# Patient Record
Sex: MALE | Race: WHITE | NOT HISPANIC OR LATINO | Employment: OTHER | URBAN - METROPOLITAN AREA
[De-identification: names, ages, dates, MRNs, and addresses within clinical notes are randomized per-mention and may not be internally consistent; named-entity substitution may affect disease eponyms.]

---

## 2021-10-02 ENCOUNTER — HOSPITAL ENCOUNTER (EMERGENCY)
Facility: HOSPITAL | Age: 41
Discharge: HOME | End: 2021-10-02
Attending: EMERGENCY MEDICINE | Admitting: EMERGENCY MEDICINE

## 2021-10-02 ENCOUNTER — APPOINTMENT (OUTPATIENT)
Dept: GENERAL RADIOLOGY | Facility: HOSPITAL | Age: 41
End: 2021-10-02

## 2021-10-02 VITALS
DIASTOLIC BLOOD PRESSURE: 119 MMHG | TEMPERATURE: 96.9 F | HEIGHT: 73 IN | BODY MASS INDEX: 41.75 KG/M2 | SYSTOLIC BLOOD PRESSURE: 159 MMHG | WEIGHT: 315 LBS | HEART RATE: 84 BPM | RESPIRATION RATE: 17 BRPM | OXYGEN SATURATION: 96 %

## 2021-10-02 DIAGNOSIS — I10 ELEVATED BLOOD PRESSURE READING WITH DIAGNOSIS OF HYPERTENSION: ICD-10-CM

## 2021-10-02 DIAGNOSIS — R52 GENERALIZED BODY ACHES: ICD-10-CM

## 2021-10-02 DIAGNOSIS — U07.1 COVID-19 VIRUS INFECTION: Primary | ICD-10-CM

## 2021-10-02 LAB
ALBUMIN SERPL-MCNC: 3.9 G/DL (ref 3.5–5.2)
ALBUMIN/GLOB SERPL: 1.1 G/DL
ALP SERPL-CCNC: 58 U/L (ref 39–117)
ALT SERPL W P-5'-P-CCNC: 40 U/L (ref 1–41)
ANION GAP SERPL CALCULATED.3IONS-SCNC: 12 MMOL/L (ref 5–15)
AST SERPL-CCNC: 42 U/L (ref 1–40)
BACTERIA UR QL AUTO: NORMAL /HPF
BASOPHILS # BLD AUTO: 0.02 10*3/MM3 (ref 0–0.2)
BASOPHILS NFR BLD AUTO: 0.2 % (ref 0–1.5)
BILIRUB SERPL-MCNC: 0.4 MG/DL (ref 0–1.2)
BILIRUB UR QL STRIP: ABNORMAL
BUN SERPL-MCNC: 13 MG/DL (ref 6–20)
BUN/CREAT SERPL: 11.8 (ref 7–25)
CALCIUM SPEC-SCNC: 8.8 MG/DL (ref 8.6–10.5)
CHLORIDE SERPL-SCNC: 105 MMOL/L (ref 98–107)
CLARITY UR: CLEAR
CO2 SERPL-SCNC: 23 MMOL/L (ref 22–29)
COLOR UR: ABNORMAL
CREAT SERPL-MCNC: 1.1 MG/DL (ref 0.76–1.27)
DEPRECATED RDW RBC AUTO: 45.9 FL (ref 37–54)
EOSINOPHIL # BLD AUTO: 0.01 10*3/MM3 (ref 0–0.4)
EOSINOPHIL NFR BLD AUTO: 0.1 % (ref 0.3–6.2)
ERYTHROCYTE [DISTWIDTH] IN BLOOD BY AUTOMATED COUNT: 12.8 % (ref 12.3–15.4)
GFR SERPL CREATININE-BSD FRML MDRD: 75 ML/MIN/1.73
GLOBULIN UR ELPH-MCNC: 3.4 GM/DL
GLUCOSE SERPL-MCNC: 113 MG/DL (ref 65–99)
GLUCOSE UR STRIP-MCNC: NEGATIVE MG/DL
HCT VFR BLD AUTO: 45.8 % (ref 37.5–51)
HGB BLD-MCNC: 16.2 G/DL (ref 13–17.7)
HGB UR QL STRIP.AUTO: NEGATIVE
HOLD SPECIMEN: NORMAL
HOLD SPECIMEN: NORMAL
HYALINE CASTS UR QL AUTO: NORMAL /LPF
IMM GRANULOCYTES # BLD AUTO: 0.03 10*3/MM3 (ref 0–0.05)
IMM GRANULOCYTES NFR BLD AUTO: 0.3 % (ref 0–0.5)
KETONES UR QL STRIP: ABNORMAL
LEUKOCYTE ESTERASE UR QL STRIP.AUTO: NEGATIVE
LIPASE SERPL-CCNC: 35 U/L (ref 13–60)
LYMPHOCYTES # BLD AUTO: 2.75 10*3/MM3 (ref 0.7–3.1)
LYMPHOCYTES NFR BLD AUTO: 31.3 % (ref 19.6–45.3)
MCH RBC QN AUTO: 34.2 PG (ref 26.6–33)
MCHC RBC AUTO-ENTMCNC: 35.4 G/DL (ref 31.5–35.7)
MCV RBC AUTO: 96.6 FL (ref 79–97)
MONOCYTES # BLD AUTO: 0.43 10*3/MM3 (ref 0.1–0.9)
MONOCYTES NFR BLD AUTO: 4.9 % (ref 5–12)
MUCOUS THREADS URNS QL MICRO: NORMAL /HPF
NEUTROPHILS NFR BLD AUTO: 5.56 10*3/MM3 (ref 1.7–7)
NEUTROPHILS NFR BLD AUTO: 63.2 % (ref 42.7–76)
NITRITE UR QL STRIP: NEGATIVE
NRBC BLD AUTO-RTO: 0 /100 WBC (ref 0–0.2)
PH UR STRIP.AUTO: 5.5 [PH] (ref 5–8)
PLATELET # BLD AUTO: 224 10*3/MM3 (ref 140–450)
PMV BLD AUTO: 10.6 FL (ref 6–12)
POTASSIUM SERPL-SCNC: 4.1 MMOL/L (ref 3.5–5.2)
PROT SERPL-MCNC: 7.3 G/DL (ref 6–8.5)
PROT UR QL STRIP: ABNORMAL
RBC # BLD AUTO: 4.74 10*6/MM3 (ref 4.14–5.8)
RBC # UR: NORMAL /HPF
REF LAB TEST METHOD: NORMAL
SODIUM SERPL-SCNC: 140 MMOL/L (ref 136–145)
SP GR UR STRIP: 1.04 (ref 1–1.03)
SQUAMOUS #/AREA URNS HPF: NORMAL /HPF
TRANS CELLS #/AREA URNS HPF: NORMAL /HPF
UROBILINOGEN UR QL STRIP: ABNORMAL
WBC # BLD AUTO: 8.8 10*3/MM3 (ref 3.4–10.8)
WBC UR QL AUTO: NORMAL /HPF
WHOLE BLOOD HOLD SPECIMEN: NORMAL
WHOLE BLOOD HOLD SPECIMEN: NORMAL

## 2021-10-02 PROCEDURE — 83690 ASSAY OF LIPASE: CPT

## 2021-10-02 PROCEDURE — 99284 EMERGENCY DEPT VISIT MOD MDM: CPT

## 2021-10-02 PROCEDURE — 71045 X-RAY EXAM CHEST 1 VIEW: CPT

## 2021-10-02 PROCEDURE — 81001 URINALYSIS AUTO W/SCOPE: CPT | Performed by: EMERGENCY MEDICINE

## 2021-10-02 PROCEDURE — 80053 COMPREHEN METABOLIC PANEL: CPT

## 2021-10-02 PROCEDURE — 85025 COMPLETE CBC W/AUTO DIFF WBC: CPT

## 2021-10-02 RX ORDER — IBUPROFEN 800 MG/1
800 TABLET ORAL ONCE
Status: COMPLETED | OUTPATIENT
Start: 2021-10-02 | End: 2021-10-02

## 2021-10-02 RX ORDER — SODIUM CHLORIDE 9 MG/ML
10 INJECTION INTRAVENOUS AS NEEDED
Status: DISCONTINUED | OUTPATIENT
Start: 2021-10-02 | End: 2021-10-03 | Stop reason: HOSPADM

## 2021-10-02 RX ADMIN — IBUPROFEN 800 MG: 800 TABLET, FILM COATED ORAL at 23:51

## 2021-10-03 LAB — HOLD SPECIMEN: NORMAL

## 2021-10-03 NOTE — ED PROVIDER NOTES
Subjective   Patient is a 40 year old male who presents to the ED with complaints of bilateral flank pain. Patient reports that he is positive for COVID-19 infection, diagnosed on Tuesday. He reports since this time he has had generalized body aches and shortness of breath. Patient states that he was vaccinated. He also reports that received an antibody infusion this week. He feels as if he is still not getting any better and continues to have body aches which are most focused in his lower back on both sides. He denies anything specific that seems to make his symptoms better or worse. He denies any additional associated symptoms on exam. He shares that he was seen and evaluated at [facility] in their ED today with a workup and told there was nothing acute they could find and was discharged home.           Review of Systems   Constitutional: Positive for chills, fatigue and fever.   HENT: Negative.    Respiratory: Positive for shortness of breath. Negative for cough.    Cardiovascular: Negative.    Gastrointestinal: Negative.    Genitourinary: Negative.    Musculoskeletal: Positive for arthralgias, back pain and myalgias.   Skin: Negative.    Neurological: Negative.        No past medical history on file.    No Known Allergies    No past surgical history on file.    No family history on file.    Social History     Socioeconomic History   • Marital status: Single     Spouse name: Not on file   • Number of children: Not on file   • Years of education: Not on file   • Highest education level: Not on file           Objective   Physical Exam  Vitals and nursing note reviewed.   Constitutional:       General: He is not in acute distress.     Appearance: Normal appearance. He is obese. He is not ill-appearing or toxic-appearing.   HENT:      Head: Normocephalic and atraumatic.      Nose: Nose normal.      Mouth/Throat:      Mouth: Mucous membranes are moist.   Eyes:      Extraocular Movements: Extraocular  movements intact.      Conjunctiva/sclera: Conjunctivae normal.   Cardiovascular:      Rate and Rhythm: Normal rate and regular rhythm.      Pulses: Normal pulses.      Heart sounds: Normal heart sounds.   Pulmonary:      Effort: Pulmonary effort is normal. No respiratory distress.      Breath sounds: Normal breath sounds.   Abdominal:      Tenderness: There is no abdominal tenderness.   Musculoskeletal:         General: Normal range of motion.      Cervical back: Normal range of motion and neck supple.   Skin:     General: Skin is warm and dry.   Neurological:      General: No focal deficit present.      Mental Status: He is alert.   Psychiatric:         Mood and Affect: Mood normal.         Behavior: Behavior normal.         Thought Content: Thought content normal.         Judgment: Judgment normal.         Procedures           ED Course  ED Course as of Oct 03 1925   Sun Oct 03, 2021   1921 Patient COVID+ presents to ED with complaints of back pain and shortness of breath. No acute or emergent findings on physical exam, patient in no acute respiratory distress. No acute or emergent findings on labs or urinalysis. Chest x-ray demonstrates no active disease.  Patient is afebrile, nontoxic appearing, vital signs stable and able to maintain O2 sats of 96% on room air. Patient will be discharged home with symptomatic care and outpatient follow up to their primary care provider as recommended. Patient is agreeable to plan of care of outpatient follow up, provided clear return precautions and demonstrated understanding.    [JG]      ED Course User Index  [JG] Josue Garrett PA      Recent Results (from the past 24 hour(s))   Comprehensive Metabolic Panel    Collection Time: 10/02/21  8:57 PM    Specimen: Blood   Result Value Ref Range    Glucose 113 (H) 65 - 99 mg/dL    BUN 13 6 - 20 mg/dL    Creatinine 1.10 0.76 - 1.27 mg/dL    Sodium 140 136 - 145 mmol/L    Potassium 4.1 3.5 - 5.2 mmol/L    Chloride 105 98 - 107  mmol/L    CO2 23.0 22.0 - 29.0 mmol/L    Calcium 8.8 8.6 - 10.5 mg/dL    Total Protein 7.3 6.0 - 8.5 g/dL    Albumin 3.90 3.50 - 5.20 g/dL    ALT (SGPT) 40 1 - 41 U/L    AST (SGOT) 42 (H) 1 - 40 U/L    Alkaline Phosphatase 58 39 - 117 U/L    Total Bilirubin 0.4 0.0 - 1.2 mg/dL    eGFR Non African Amer 75 >60 mL/min/1.73    Globulin 3.4 gm/dL    A/G Ratio 1.1 g/dL    BUN/Creatinine Ratio 11.8 7.0 - 25.0    Anion Gap 12.0 5.0 - 15.0 mmol/L   Lipase    Collection Time: 10/02/21  8:57 PM    Specimen: Blood   Result Value Ref Range    Lipase 35 13 - 60 U/L   Green Top (Gel)    Collection Time: 10/02/21  8:57 PM   Result Value Ref Range    Extra Tube Hold for add-ons.    Lavender Top    Collection Time: 10/02/21  8:57 PM   Result Value Ref Range    Extra Tube hold for add-on    Gold Top - SST    Collection Time: 10/02/21  8:57 PM   Result Value Ref Range    Extra Tube Hold for add-ons.    Gray Top    Collection Time: 10/02/21  8:57 PM   Result Value Ref Range    Extra Tube Hold for add-ons.    Light Blue Top    Collection Time: 10/02/21  8:57 PM   Result Value Ref Range    Extra Tube hold for add-on    CBC Auto Differential    Collection Time: 10/02/21  8:57 PM    Specimen: Blood   Result Value Ref Range    WBC 8.80 3.40 - 10.80 10*3/mm3    RBC 4.74 4.14 - 5.80 10*6/mm3    Hemoglobin 16.2 13.0 - 17.7 g/dL    Hematocrit 45.8 37.5 - 51.0 %    MCV 96.6 79.0 - 97.0 fL    MCH 34.2 (H) 26.6 - 33.0 pg    MCHC 35.4 31.5 - 35.7 g/dL    RDW 12.8 12.3 - 15.4 %    RDW-SD 45.9 37.0 - 54.0 fl    MPV 10.6 6.0 - 12.0 fL    Platelets 224 140 - 450 10*3/mm3    Neutrophil % 63.2 42.7 - 76.0 %    Lymphocyte % 31.3 19.6 - 45.3 %    Monocyte % 4.9 (L) 5.0 - 12.0 %    Eosinophil % 0.1 (L) 0.3 - 6.2 %    Basophil % 0.2 0.0 - 1.5 %    Immature Grans % 0.3 0.0 - 0.5 %    Neutrophils, Absolute 5.56 1.70 - 7.00 10*3/mm3    Lymphocytes, Absolute 2.75 0.70 - 3.10 10*3/mm3    Monocytes, Absolute 0.43 0.10 - 0.90 10*3/mm3    Eosinophils, Absolute 0.01  0.00 - 0.40 10*3/mm3    Basophils, Absolute 0.02 0.00 - 0.20 10*3/mm3    Immature Grans, Absolute 0.03 0.00 - 0.05 10*3/mm3    nRBC 0.0 0.0 - 0.2 /100 WBC   Urinalysis With Microscopic If Indicated (No Culture) - Urine, Clean Catch    Collection Time: 10/02/21 10:43 PM    Specimen: Urine, Clean Catch   Result Value Ref Range    Color, UA Dark Yellow (A) Yellow, Straw    Appearance, UA Clear Clear    pH, UA 5.5 5.0 - 8.0    Specific Gravity, UA 1.037 (H) 1.001 - 1.030    Glucose, UA Negative Negative    Ketones, UA Trace (A) Negative    Bilirubin, UA Small (1+) (A) Negative    Blood, UA Negative Negative    Protein, UA 30 mg/dL (1+) (A) Negative    Leuk Esterase, UA Negative Negative    Nitrite, UA Negative Negative    Urobilinogen, UA 1.0 E.U./dL 0.2 - 1.0 E.U./dL   Urinalysis, Microscopic Only - Urine, Clean Catch    Collection Time: 10/02/21 10:43 PM    Specimen: Urine, Clean Catch   Result Value Ref Range    RBC, UA 0-2 None Seen, 0-2 /HPF    WBC, UA 0-2 None Seen, 0-2 /HPF    Bacteria, UA None Seen None Seen, Trace /HPF    Squamous Epithelial Cells, UA 0-2 None Seen, 0-2 /HPF    Transitional Epithelial Cells, UA 0-2 0 - 2 /HPF    Hyaline Casts, UA 0-6 0 - 6 /LPF    Mucus, UA Trace None Seen, Trace /HPF    Methodology Manual Light Microscopy      Note: In addition to lab results from this visit, the labs listed above may include labs taken at another facility or during a different encounter within the last 24 hours. Please correlate lab times with ED admission and discharge times for further clarification of the services performed during this visit.    XR Chest 1 View   Final Result   No active disease      Signer Name: Cristina Luque MD    Signed: 10/2/2021 11:12 PM    Workstation Name: [email or web address]     [facility]        Vitals:    10/02/21 2214 10/02/21 2230 10/02/21 2300 10/02/21 2330   BP: (!) 178/117 (!) 176/125 (!) 179/125 (!) 159/119   BP Location:       Patient Position:       Pulse:  106 97 89 84   Resp:       Temp:       TempSrc:       SpO2: 95% 94% 94% 96%   Weight:       Height:         Medications   ibuprofen (ADVIL,MOTRIN) tablet 800 mg (800 mg Oral Given 10/2/21 0132)     ECG/EMG Results (last 24 hours)     ** No results found for the last 24 hours. **        No orders to display                                          MDM  Number of Diagnoses or Management Options  COVID-19 virus infection: new and requires workup  Elevated blood pressure reading with diagnosis of hypertension: minor  Generalized body aches: new and requires workup     Amount and/or Complexity of Data Reviewed  Clinical lab tests: reviewed  Tests in the radiology section of CPT®: reviewed    Risk of Complications, Morbidity, and/or Mortality  Presenting problems: moderate  Diagnostic procedures: moderate  Management options: moderate    Patient Progress  Patient progress: stable      Final diagnoses:   COVID-19 virus infection   Generalized body aches   Elevated blood pressure reading with diagnosis of hypertension       ED Disposition  ED Disposition     ED Disposition Condition Comment    Discharge Stable           PATIENT [facility] [place] [address]  [phone]  Call   As needed for follow-up with primary care    [facility]  [address] [facility] [id number] [phone]  Go to   If symptoms worsen         Medication List      No changes were made to your prescriptions during this visit.          Josue Garrett PA  10/03/21 1925

## 2021-10-03 NOTE — DISCHARGE INSTRUCTIONS
Symptomatic care is recommended with tylenol or ibuprofen as needed for pain. Take all medications as prescribed and instructed. Follow up with primary care as directed or return to Emergency Department with worsening of symptoms.

## 2022-04-12 PROBLEM — I10 ESSENTIAL HYPERTENSION: Status: ACTIVE | Noted: 2022-04-12

## 2022-04-12 PROBLEM — E78.5 HYPERLIPIDEMIA LDL GOAL <100: Status: ACTIVE | Noted: 2022-04-12

## 2022-04-12 PROBLEM — R07.9 CHEST PAIN: Status: ACTIVE | Noted: 2022-04-12

## 2022-04-12 PROBLEM — I51.7 LVH (LEFT VENTRICULAR HYPERTROPHY): Status: ACTIVE | Noted: 2022-04-12

## 2022-04-12 PROBLEM — M79.606 LEG PAIN: Status: ACTIVE | Noted: 2022-04-12

## 2022-04-12 NOTE — PROGRESS NOTES
OFFICE VISIT  NOTE  [facility] CARDIOLOGY      Name: Ziggy Liriano    Date: 2022  MRN:  [id number]  :  1980      REFERRING/PRIMARY PROVIDER:  Provider, No Known    Chief Complaint   Patient presents with   • Ventricular hypertrophy   • Leg Swelling   • Edema       HPI: Ziggy Liriano is a 41 y.o. male who presents today for new consultation for left ventricular hypertrophy.  He reports increased lower extremity edema after having Covid in 2021 and then had COVID again 2021 and then lower extremity was started.  Also has chest pain and pressure, had nuclear stress test at [place] 2021 it was low risk, echo showed normal EF with mild LVH.  He was started on Lasix 40 mg daily but still has persistent lower extremity edema.  He has lost approximately 100 pounds over the last 1-2 years.  He saw a nutritionist yesterday working on low-sodium diet.    Past Medical History:   Diagnosis Date   • Sleep apnea        History reviewed. No pertinent surgical history.    Social History     Socioeconomic History   • Marital status: Single   Tobacco Use   • Smoking status: Former Smoker   • Smokeless tobacco: Former User     Types: Snuff   Substance and Sexual Activity   • Alcohol use: Never   • Drug use: Never   • Sexual activity: Defer       Family History   Problem Relation Age of Onset   • No Known Problems Mother    • No Known Problems Father         ROS:   Constitutional no fever,  no weight loss   Skin no rash, no subcutaneous nodules   Otolaryngeal no difficulty swallowing   Cardiovascular See HPI   Pulmonary no cough, no sputum production   Gastrointestinal no constipation, no diarrhea   Genitourinary no dysuria, no hematuria   Hematologic no easy bruisability, no abnormal bleeding   Musculoskeletal no muscle pain   Neurologic no dizziness, no falls         No Known Allergies      Current Outpatient Medications:   •  diclofenac (VOLTAREN) 75 MG EC tablet, As  "Needed., Disp: , Rfl:   •  dicyclomine (BENTYL) 20 MG tablet, As Needed., Disp: , Rfl:   •  fenofibrate 160 MG tablet, Take 160 mg by mouth Every Evening., Disp: , Rfl:   •  furosemide (LASIX) 40 MG tablet, Take 40 mg by mouth Daily., Disp: , Rfl:   •  losartan (COZAAR) 50 MG tablet, Take 50 mg by mouth Daily., Disp: , Rfl:     Vitals:    04/13/22 1329   BP: 142/84   BP Location: Left arm   Patient Position: Sitting   Pulse: 76   SpO2: 97%   Weight: (!) 164 kg (362 lb 3.2 oz)   Height: 185.4 cm (73\")     Body mass index is 47.79 kg/m².    PHYSICAL EXAM:    General Appearance:   · well developed  · well nourished  HENT:   · oropharynx moist  · lips not cyanotic  Neck:  · thyroid not enlarged  · supple  Respiratory:  · no respiratory distress  · normal breath sounds  · no rales  Cardiovascular:  · no jugular venous distention  · regular rhythm  · apical impulse normal  · S1 normal, S2 normal  · no S3, no S4   · no murmur  · no rub, no thrill  · carotid pulses normal; no bruit  · lower extremity edema: 3+ nonpitting    Musculoskeletal:  · no clubbing of fingers.   · normocephalic, head atraumatic  Skin:   · warm, dry  Psychiatric:  · judgement and insight appropriate  · normal mood and affect    RESULTS:     ECG 12 Lead    Date/Time: 4/13/2022 1:55 PM  Performed by: Maxwell Mendez MD  Authorized by: Maxwell Mendez MD   Comparison: not compared with previous ECG   Previous ECG: no previous ECG available  Rhythm: sinus rhythm  Rate: normal  BPM: 76  QRS axis: normal    Clinical impression: abnormal EKG  Comments: LVH noted                  Labs:  Lab Results   Component Value Date    AST 36 11/26/2021    ALT 51 (H) 11/26/2021     Lab Results   Component Value Date    HGBA1C 4.6 11/26/2021     No components found for: CREATINININE  eGFR Non  Am   Date Value Ref Range Status   11/26/2021 >60 >60 mL/min/1.73m*2 Final     Comment:     eGFR = estimated GFR; eGFR units = mL/min/1.73 sq meters Chronic Kidney " Disease is considered if eGFR <60 mL/min/1.73 sq meters Kidney failure is considered if eGFR is <15 mL/min/1.73 sq meters. eGFR assumes steady state plasma creatinine concentration; not applicable if renal function is rapidly changing or patient is on dialysis.   11/26/2021 >60 >60 mL/min/1.73m*2 Final     Comment:     eGFR = estimated GFR; eGFR units = mL/min/1.73 sq meters Chronic Kidney Disease is considered if eGFR <60 mL/min/1.73 sq meters Kidney failure is considered if eGFR is <15 mL/min/1.73 sq meters. eGFR assumes steady state plasma creatinine concentration; not applicable if renal function is rapidly changing or patient is on dialysis.     eGFR Non  Amer   Date Value Ref Range Status   10/02/2021 75 >60 mL/min/1.73 Final       Most recent PCP note, imaging tests, and labs reviewed.    ASSESSMENT:  Problem List Items Addressed This Visit        Cardiac and Vasculature    Essential hypertension - Primary    Relevant Medications    losartan (COZAAR) 50 MG tablet    furosemide (LASIX) 40 MG tablet    Hyperlipidemia LDL goal <100    Relevant Medications    fenofibrate 160 MG tablet    LVH (left ventricular hypertrophy)    Overview     11/5/21 Echo: EF 65-70%, mild LVH           Chest pain    Overview     11/5/21 Stress test: No evidence of ischemia or scar noted.              Musculoskeletal and Injuries    Leg pain    Overview     12/6/21 COSMO: Right 1.06, left 1.01                 PLAN:    1.  Concentric LVH:  Mild on echo 11/2021 at [place]  Likely secondary to uncontrolled hypertension  I advised the patient to take his medicines regularly, he misses his medicines 1-2 times per week.  Low-sodium diet increase exercise and weight loss recommended    2.  Nonpitting edema:  Increase Lasix to 60 mg daily  Low-sodium diet, exercise, weight loss recommended  Continue support stockings    3.  Hypertension:  We spent most of the visit talking about the importance of medication adherence, low-sodium diet,  exercise, and weight loss.  Continue current medical therapy.    Advance Care Planning   ACP discussion was held with the patient during this visit. Patient does not have an advance directive, declines further assistance.         Return to clinic in 6 months, or sooner as needed.    Thank you for the opportunity to share in the care of your patient; please do not hesitate to call me with any questions.     Maxwell Mendez MD, [facility]  Office: [phone] [address] [place] [address]    04/13/22

## 2022-04-13 ENCOUNTER — OFFICE VISIT (OUTPATIENT)
Dept: CARDIOLOGY | Facility: CLINIC | Age: 42
End: 2022-04-13

## 2022-04-13 VITALS
WEIGHT: 315 LBS | DIASTOLIC BLOOD PRESSURE: 84 MMHG | BODY MASS INDEX: 41.75 KG/M2 | SYSTOLIC BLOOD PRESSURE: 142 MMHG | HEIGHT: 73 IN | OXYGEN SATURATION: 97 % | HEART RATE: 76 BPM

## 2022-04-13 DIAGNOSIS — E78.5 HYPERLIPIDEMIA LDL GOAL <100: ICD-10-CM

## 2022-04-13 DIAGNOSIS — R07.9 CHEST PAIN, UNSPECIFIED TYPE: ICD-10-CM

## 2022-04-13 DIAGNOSIS — M79.604 PAIN IN BOTH LOWER EXTREMITIES: ICD-10-CM

## 2022-04-13 DIAGNOSIS — M79.605 PAIN IN BOTH LOWER EXTREMITIES: ICD-10-CM

## 2022-04-13 DIAGNOSIS — I51.7 LVH (LEFT VENTRICULAR HYPERTROPHY): ICD-10-CM

## 2022-04-13 DIAGNOSIS — I10 ESSENTIAL HYPERTENSION: Primary | ICD-10-CM

## 2022-04-13 PROCEDURE — 93000 ELECTROCARDIOGRAM COMPLETE: CPT | Performed by: INTERNAL MEDICINE

## 2022-04-13 PROCEDURE — 99204 OFFICE O/P NEW MOD 45 MIN: CPT | Performed by: INTERNAL MEDICINE

## 2022-04-13 RX ORDER — DICLOFENAC SODIUM 75 MG/1
75 TABLET, DELAYED RELEASE ORAL AS NEEDED
COMMUNITY
Start: 2022-04-04

## 2022-04-13 RX ORDER — FUROSEMIDE 40 MG/1
40 TABLET ORAL DAILY
COMMUNITY
Start: 2022-01-25

## 2022-04-13 RX ORDER — CHLORTHALIDONE 25 MG/1
TABLET ORAL AS NEEDED
COMMUNITY
Start: 2022-04-02 | End: 2022-04-13

## 2022-04-13 RX ORDER — LOSARTAN POTASSIUM 50 MG/1
50 TABLET ORAL DAILY
COMMUNITY
Start: 2022-03-01

## 2022-04-13 RX ORDER — DICYCLOMINE HCL 20 MG
20 TABLET ORAL AS NEEDED
COMMUNITY
Start: 2022-02-26

## 2022-04-13 RX ORDER — FENOFIBRATE 160 MG/1
160 TABLET ORAL EVERY EVENING
COMMUNITY
Start: 2022-03-30

## 2022-10-17 ENCOUNTER — OFFICE VISIT (OUTPATIENT)
Dept: CARDIOLOGY | Facility: CLINIC | Age: 42
End: 2022-10-17

## 2022-10-17 VITALS
HEART RATE: 91 BPM | DIASTOLIC BLOOD PRESSURE: 74 MMHG | WEIGHT: 315 LBS | SYSTOLIC BLOOD PRESSURE: 126 MMHG | HEIGHT: 73 IN | BODY MASS INDEX: 41.75 KG/M2 | OXYGEN SATURATION: 97 %

## 2022-10-17 DIAGNOSIS — E78.5 HYPERLIPIDEMIA LDL GOAL <100: ICD-10-CM

## 2022-10-17 DIAGNOSIS — I51.7 LVH (LEFT VENTRICULAR HYPERTROPHY): ICD-10-CM

## 2022-10-17 DIAGNOSIS — I10 ESSENTIAL HYPERTENSION: ICD-10-CM

## 2022-10-17 DIAGNOSIS — R07.9 CHEST PAIN, UNSPECIFIED TYPE: Primary | ICD-10-CM

## 2022-10-17 PROCEDURE — 99214 OFFICE O/P EST MOD 30 MIN: CPT | Performed by: INTERNAL MEDICINE

## 2022-10-17 RX ORDER — CETIRIZINE HYDROCHLORIDE 10 MG/1
10 TABLET ORAL DAILY
COMMUNITY
Start: 2022-10-03

## 2022-10-17 NOTE — PROGRESS NOTES
OFFICE VISIT  NOTE  [facility] CARDIOLOGY [place] [facility]      Name: Ziggy Liriano    Date: 10/17/2022  MRN:  [id number]  :  1980      REFERRING/PRIMARY PROVIDER:  Provider, No Known    Chief Complaint   Patient presents with   • Essential hypertension       HPI: Ziggy Liriano is a 41 y.o. male who presents today for left ventricular hypertrophy and hypertension.  He reports increased lower extremity edema after having Covid in 2021 and then had COVID again 2021 and then lower extremity was started.  Also has chest pain and pressure, had nuclear stress test at [place] 2021 it was low risk, echo showed normal EF with mild LVH.  Doing better since first visit, blood pressure better controlled, he takes his medicines more regularly.  Working on weight loss, had some success but then back tract.  Same weight as he was 6 months ago    Past Medical History:   Diagnosis Date   • Sleep apnea        History reviewed. No pertinent surgical history.    Social History     Socioeconomic History   • Marital status: Single   Tobacco Use   • Smoking status: Former   • Smokeless tobacco: Former     Types: Snuff   Vaping Use   • Vaping Use: Never used   Substance and Sexual Activity   • Alcohol use: Never   • Drug use: Never   • Sexual activity: Defer       Family History   Problem Relation Age of Onset   • No Known Problems Mother    • No Known Problems Father         ROS:   Constitutional no fever,  no weight loss   Skin no rash, no subcutaneous nodules   Otolaryngeal no difficulty swallowing   Cardiovascular See HPI   Pulmonary no cough, no sputum production   Gastrointestinal no constipation, no diarrhea   Genitourinary no dysuria, no hematuria   Hematologic no easy bruisability, no abnormal bleeding   Musculoskeletal no muscle pain   Neurologic no dizziness, no falls         No Known Allergies      Current Outpatient Medications:   •  ALLERGY SERUM INJECTION, Inject   "under the skin into the appropriate area as directed 1 (One) Time Per Week., Disp: , Rfl:   •  cetirizine (zyrTEC) 10 MG tablet, Take 1 tablet by mouth Daily., Disp: , Rfl:   •  diclofenac (VOLTAREN) 75 MG EC tablet, Take 1 tablet by mouth As Needed., Disp: , Rfl:   •  dicyclomine (BENTYL) 20 MG tablet, Take 1 tablet by mouth As Needed., Disp: , Rfl:   •  fenofibrate 160 MG tablet, Take 160 mg by mouth Every Evening., Disp: , Rfl:   •  furosemide (LASIX) 40 MG tablet, Take 40 mg by mouth Daily., Disp: , Rfl:   •  losartan (COZAAR) 50 MG tablet, Take 50 mg by mouth Daily., Disp: , Rfl:     Vitals:    10/17/22 1151   BP: 126/74   BP Location: Left arm   Patient Position: Sitting   Cuff Size: Adult   Pulse: 91   SpO2: 97%   Weight: (!) 164 kg (361 lb)   Height: 185.4 cm (73\")     Body mass index is 47.63 kg/m².    PHYSICAL EXAM:    General Appearance:   · well developed  · well nourished  HENT:   · oropharynx moist  · lips not cyanotic  Neck:  · thyroid not enlarged  · supple  Respiratory:  · no respiratory distress  · normal breath sounds  · no rales  Cardiovascular:  · no jugular venous distention  · regular rhythm  · apical impulse normal  · S1 normal, S2 normal  · no S3, no S4   · no murmur  · no rub, no thrill  · carotid pulses normal; no bruit  · lower extremity edema: 3+ nonpitting    Musculoskeletal:  · no clubbing of fingers.   · normocephalic, head atraumatic  Skin:   · warm, dry  Psychiatric:  · judgement and insight appropriate  · normal mood and affect    RESULTS:   Procedures          Labs:  Lab Results   Component Value Date    AST 36 11/26/2021    ALT 51 (H) 11/26/2021     Lab Results   Component Value Date    HGBA1C 4.6 11/26/2021     No components found for: CREATINININE  eGFR Non  Am   Date Value Ref Range Status   11/26/2021 >60 >60 mL/min/1.73m*2 Final     Comment:     eGFR = estimated GFR; eGFR units = mL/min/1.73 sq meters Chronic Kidney Disease is considered if eGFR <60 mL/min/1.73 sq " meters Kidney failure is considered if eGFR is <15 mL/min/1.73 sq meters. eGFR assumes steady state plasma creatinine concentration; not applicable if renal function is rapidly changing or patient is on dialysis.   11/26/2021 >60 >60 mL/min/1.73m*2 Final     Comment:     eGFR = estimated GFR; eGFR units = mL/min/1.73 sq meters Chronic Kidney Disease is considered if eGFR <60 mL/min/1.73 sq meters Kidney failure is considered if eGFR is <15 mL/min/1.73 sq meters. eGFR assumes steady state plasma creatinine concentration; not applicable if renal function is rapidly changing or patient is on dialysis.     eGFR Non  Amer   Date Value Ref Range Status   10/02/2021 75 >60 mL/min/1.73 Final       Most recent PCP note, imaging tests, and labs reviewed.    ASSESSMENT:  Problem List Items Addressed This Visit        Cardiac and Vasculature    Essential hypertension    Hyperlipidemia LDL goal <100    LVH (left ventricular hypertrophy)    Overview     11/5/21 Echo: EF 65-70%, mild LVH         Chest pain - Primary    Overview     11/5/21 Stress test: No evidence of ischemia or scar noted.            PLAN:    1.  Concentric LVH:  Mild on echo 11/2021 at [place]  Likely secondary to uncontrolled hypertension  I advised the patient to take his medicines regularly, he misses his medicines 1-2 times per week.  Low-sodium diet increase exercise and weight loss recommended    2.  Nonpitting edema:  Increase Lasix to 60 mg daily, if needed for edema  Low-sodium diet, exercise, weight loss recommended  Continue support stockings    3.  Hypertension:  Well-controlled on current regimen of losartan, continue for now.    4.  Morbid obesity, BMI 47:  Discussed importance of decreased caloric intake specifically carbohydrates, he had questions regarding bariatric surgery, I advised lifestyle modification first and using surgery as last resort.    Advance Care Planning   ACP discussion was held with the patient during this visit.  Patient does not have an advance directive, declines further assistance.         Return to clinic in 12 months, or sooner as needed.    Thank you for the opportunity to share in the care of your patient; please do not hesitate to call me with any questions.     Maxwell Mendez MD, [facility]  Office: [phone] [address] [place] [address]    10/17/22

## 2023-01-25 ENCOUNTER — TELEPHONE (OUTPATIENT)
Dept: CARDIOLOGY | Facility: CLINIC | Age: 43
End: 2023-01-25
Payer: COMMERCIAL

## 2023-01-25 DIAGNOSIS — R07.9 CHEST PAIN, UNSPECIFIED TYPE: Primary | ICD-10-CM

## 2023-01-25 DIAGNOSIS — R06.02 SOB (SHORTNESS OF BREATH): ICD-10-CM

## 2023-01-25 NOTE — TELEPHONE ENCOUNTER
Pt c/o chest pain but mentions he has had a lot of stress recently and he thinks may be r/t anxiety. However, he stated he's been having chest pains since his last appt 10/17/22. Pain localized in the middle of his chest, he rated the pain a 10/10 when he saw PCP on 1/23. Improved on its own but lasted till he went to bed that night, he took an ASA 81 mg right before bed. I asked if he had any SOB with the chest pain, he said he has asthma and wouldn't be able to tell if the SOB was r/t asthma or chest pain. Does not monitor HR or BP at home. Last stress test 11/2021- no evidence of ischemia noted, EF 70%. He is amendable to proceed with another stress test and echo. Pls advise.

## 2023-02-06 ENCOUNTER — TELEPHONE (OUTPATIENT)
Dept: CARDIOLOGY | Facility: CLINIC | Age: 43
End: 2023-02-06
Payer: COMMERCIAL

## 2023-02-06 DIAGNOSIS — R07.9 CHEST PAIN, UNSPECIFIED TYPE: Primary | ICD-10-CM

## 2023-02-06 NOTE — TELEPHONE ENCOUNTER
----- Message from Maxwell Mendez MD sent at 2023  8:53 AM EST -----  Regarding: RE: Peer to Peer  If patient is still having chest pain, switch echo/stress to coronary CTA  ----- Message -----  From: Ziggy Cortez RegSched Rep  Sent: 2/3/2023   5:12 PM EST  To: Maxwell Mendez MD, #  Subject: Peer to Peer                                     [facility] is wanting a peer to peer to approve his echo and stress test. I sent them everything we have on him but they said it didn't meet their internal guidelines. Would one of you be able to call in for that? We have five business days to call in if you do.     Phone: [phone] option 4  Trackin

## 2023-02-06 NOTE — TELEPHONE ENCOUNTER
Spoke with pt and he still reports intermittent chest pain and agreeable to proceed with coronary CTA. Orders placed and msg sent to Shabbir to schedule.

## 2023-02-09 ENCOUNTER — TELEPHONE (OUTPATIENT)
Dept: CARDIOLOGY | Facility: CLINIC | Age: 43
End: 2023-02-09
Payer: COMMERCIAL

## 2023-02-09 NOTE — TELEPHONE ENCOUNTER
Spoke with pt, he is currently at the [facility] and will call when he is released so I can get records.

## 2023-02-09 NOTE — TELEPHONE ENCOUNTER
Caller: Ziggy Liriano    Relationship: Self    Best call back number: [phone]    What is the best time to reach you: ANY    Who are you requesting to speak with (clinical staff, provider,  specific staff member): CLINICAL      What was the call regarding: PT IS HAVING CHEST PAIN ISSUES, SHARP PIN PRICKS ON EXERTION. HE SEES DR. MCFADDEN IN [place]. HE HAS A 1 YEAR FU TOWARDS THE END OF October AND NEEDS TO BE SEEN ALITTLE SOONER. PLEASE REACH OUT TO PT.     Do you require a callback: YES

## 2023-07-28 ENCOUNTER — TELEPHONE (OUTPATIENT)
Dept: CARDIOLOGY | Facility: CLINIC | Age: 43
End: 2023-07-28
Payer: COMMERCIAL

## 2023-07-28 NOTE — TELEPHONE ENCOUNTER
----- Message from Maxwell Mendez MD sent at 7/26/2023  8:52 AM EDT -----  Please inform the patient of their test results. Thank you.

## 2023-08-03 ENCOUNTER — TELEPHONE (OUTPATIENT)
Dept: CARDIOLOGY | Facility: CLINIC | Age: 43
End: 2023-08-03
Payer: COMMERCIAL

## 2023-08-03 DIAGNOSIS — R93.89 ABNORMAL CT OF THE CHEST: ICD-10-CM

## 2023-08-03 DIAGNOSIS — R06.02 SOB (SHORTNESS OF BREATH): Primary | ICD-10-CM

## 2023-09-14 ENCOUNTER — OFFICE VISIT (OUTPATIENT)
Dept: PULMONOLOGY | Facility: CLINIC | Age: 43
End: 2023-09-14
Payer: COMMERCIAL

## 2023-09-14 VITALS
TEMPERATURE: 98 F | BODY MASS INDEX: 41.75 KG/M2 | HEART RATE: 80 BPM | OXYGEN SATURATION: 96 % | HEIGHT: 73 IN | DIASTOLIC BLOOD PRESSURE: 78 MMHG | SYSTOLIC BLOOD PRESSURE: 118 MMHG | WEIGHT: 315 LBS

## 2023-09-14 DIAGNOSIS — R91.8 GROUND GLASS OPACITY PRESENT ON IMAGING OF LUNG: Primary | ICD-10-CM

## 2023-09-14 DIAGNOSIS — E66.8 EXTREME OBESITY: ICD-10-CM

## 2023-09-14 DIAGNOSIS — R06.09 DOE (DYSPNEA ON EXERTION): ICD-10-CM

## 2023-09-14 DIAGNOSIS — G47.33 OSA (OBSTRUCTIVE SLEEP APNEA): ICD-10-CM

## 2023-09-14 PROBLEM — E66.9 EXTREME OBESITY: Status: ACTIVE | Noted: 2023-09-14

## 2023-09-14 RX ORDER — METHOCARBAMOL 500 MG/1
1 TABLET, FILM COATED ORAL EVERY 6 HOURS
COMMUNITY
Start: 2023-05-24

## 2023-09-14 RX ORDER — MONTELUKAST SODIUM 10 MG/1
TABLET ORAL
COMMUNITY
Start: 2023-09-07

## 2023-09-14 RX ORDER — TRAMADOL HYDROCHLORIDE 50 MG/1
1 TABLET ORAL EVERY 6 HOURS
COMMUNITY
Start: 2023-05-24

## 2023-09-14 RX ORDER — OMEPRAZOLE 40 MG/1
CAPSULE, DELAYED RELEASE ORAL
COMMUNITY
Start: 2023-06-30

## 2023-09-14 NOTE — PROGRESS NOTES
PULMONARY  NOTE    Chief Complaint     Abnormal CT scan of the chest, dyspnea on exertion, obstructive sleep apnea, extreme obesity 42-year-old male referred for an abnormal CT scan of the chest        History of Present Illness     He has not smoked since around age 18  He denies any past history of known lung disease    He does have a history of obstructive sleep apnea and has been prescribed CPAP  Is not using it on a regular basis, however    He underwent a calcium scoring CT scan of the chest which did not completely image the lungs  There was suggestion of a right apical groundglass opacity and he was referred to our office    No regular cough or sputum production    He does get dyspnea on exertion, such as going up a flight of stairs    Patient Active Problem List   Diagnosis    Essential hypertension    Hyperlipidemia LDL goal <100    LVH (left ventricular hypertrophy)    Chest pain    Leg pain    Ground glass opacity Right [place] stable from 2021    WILLS (dyspnea on exertion)    Extreme obesity (BMI > 50)    LACY (obstructive sleep apnea) - non-compliant with CPAP      Allergies   Allergen Reactions    Beef (Diagnostic) Unknown - Low Severity     Tested positive    Mustard Unknown - Low Severity     Allergen tested positive    Shellfish-Derived Products Unknown - Low Severity     Allergen tested positive; reports that he has had iv contrast dye and tolerated without reaction       Current Outpatient Medications:     albuterol sulfate  (90 Base) MCG/ACT inhaler, Inhale 2 puffs Every 6 (Six) Hours As Needed., Disp: , Rfl:     fenofibrate 160 MG tablet, Take 1 tablet by mouth Daily., Disp: , Rfl:     fluticasone (FLONASE) 50 MCG/ACT nasal spray, 2 sprays by Each Nare route Daily. Shake liquid, Disp: , Rfl:     furosemide (LASIX) 40 MG tablet, Take 1 tablet by mouth Daily., Disp: , Rfl:     losartan (COZAAR) 50 MG tablet, Take 1 tablet by mouth Daily., Disp: , Rfl:     ALLERGY SERUM INJECTION, Inject   under the skin into the appropriate area as directed 1 (One) Time Per Week. (Patient not taking: Reported on 9/14/2023), Disp: , Rfl:     busPIRone (BUSPAR) 10 MG tablet, Take 1 tablet by mouth 2 (Two) Times a Day. (Patient not taking: Reported on 9/14/2023), Disp: , Rfl:     cetirizine (zyrTEC) 10 MG tablet, Take 1 tablet by mouth Daily. (Patient not taking: Reported on 9/14/2023), Disp: , Rfl:     diclofenac (VOLTAREN) 75 MG EC tablet, Take 1 tablet by mouth As Needed. (Patient not taking: Reported on 9/14/2023), Disp: , Rfl:     dicyclomine (BENTYL) 20 MG tablet, Take 1 tablet by mouth As Needed. (Patient not taking: Reported on 9/14/2023), Disp: , Rfl:     hydrOXYzine (ATARAX) 25 MG tablet, Take 1 tablet by mouth Daily As Needed. (Patient not taking: Reported on 9/14/2023), Disp: , Rfl:     levocetirizine (XYZAL) 5 MG tablet, Take 1 tablet by mouth Daily. (Patient not taking: Reported on 9/14/2023), Disp: , Rfl:     methocarbamol (ROBAXIN) 500 MG tablet, Take 1 tablet by mouth Every 6 (Six) Hours. (Patient not taking: Reported on 9/14/2023), Disp: , Rfl:     montelukast (SINGULAIR) 10 MG tablet, , Disp: , Rfl:     omeprazole (priLOSEC) 40 MG capsule, TAKE 1 CAPSULE BY MOUTH DAILY 1 HOUR BEFORE FIRST MEAL (Patient not taking: Reported on 9/14/2023), Disp: , Rfl:     traMADol (ULTRAM) 50 MG tablet, Take 1 tablet by mouth Every 6 (Six) Hours. (Patient not taking: Reported on 9/14/2023), Disp: , Rfl:   MEDICATION LIST AND ALLERGIES REVIEWED.    Family History   Problem Relation Age of Onset    No Known Problems Mother     No Known Problems Father      Social History     Tobacco Use    Smoking status: Former    Smokeless tobacco: Former     Types: Snuff   Vaping Use    Vaping Use: Never used   Substance Use Topics    Alcohol use: Never    Drug use: Never     Social History     Social History Narrative    Has not smoked cigarettes since around age 18    Dipped up into 2019    Smokes marijuana     FAMILY AND SOCIAL  "HISTORY REVIEWED.    Review of Systems  IF PRESENT REFER TO SCANNED ROS SHEET FROM SAME DATE  OTHERWISE ROS OBTAINED AND NON-CONTRIBUTORY OVER HPI.    /78 (BP Location: Left arm, Patient Position: Sitting, Cuff Size: Adult)   Pulse 80   Temp 98 °F (36.7 °C)   Ht 185.4 cm (73\")   Wt (!) 178 kg (393 lb)   SpO2 96% Comment: room air at rest  BMI 51.85 kg/m²   Physical Exam  Vitals and nursing note reviewed.   Constitutional:       General: He is not in acute distress.     Appearance: He is well-developed. He is not diaphoretic.   HENT:      Head: Normocephalic and atraumatic.   Neck:      Thyroid: No thyromegaly.   Cardiovascular:      Rate and Rhythm: Normal rate and regular rhythm.      Heart sounds: No murmur heard.  Pulmonary:      Effort: Pulmonary effort is normal.      Breath sounds: Normal breath sounds. No stridor.   Lymphadenopathy:      Cervical: No cervical adenopathy.      Upper Body:      Right upper body: No supraclavicular or epitrochlear adenopathy.      Left upper body: No supraclavicular or epitrochlear adenopathy.   Skin:     General: Skin is warm and dry.   Neurological:      Mental Status: He is alert and oriented to person, place, and time.   Psychiatric:         Behavior: Behavior normal.       Results     CT coronary calcium scoring chest from 7/23/2023 reviewed on PACS  CT angiogram from [facility] from 11/4/2021 reviewed on PACS  No pulmonary emboli noted in 2021  The incompletely imaged groundglass opacity in the right apex from 7/23/2023 corresponds to localized area of parenchymal compression related to a densely calcified process in the right apex that appears to be coming off the first rib    PFTs reveal no airway obstruction, no restriction, and a normal diffusion capacity    Immunization History   Administered Date(s) Administered    COVID-19 (NILESH) 04/06/2021    COVID-19 (PFIZER) BIVALENT 12+YRS 01/19/2023    COVID-19 (PFIZER) Purple Cap Monovalent 12/15/2021    " Influenza Injectable Mdck Pf Quad 10/15/2020    Td (TDVAX) 08/14/1997     Problem List       ICD-10-CM ICD-9-CM   1. Ground glass opacity Right [place] stable from 2021  R91.8 793.19   2. WILLS (dyspnea on exertion)  R06.09 786.09   3. LACY (obstructive sleep apnea) - non-compliant with CPAP  G47.33 327.23   4. Extreme obesity (BMI > 50)  E66.8 278.00       Discussion     We reviewed his test results  I reassured him that he has no evidence of obstructive or interstitial lung disease    The groundglass opacities barely seen on the calcium scoring CT scan was present and look the same in November 4, 2021 and is probably related to the presence of a calcified process extending off of the first rib and most likely represents a benign process.  This appears to have been stable for almost 2 years between November 4, 2021 and July 2023.  At the most, I would recommend a 1 year follow-up CT scan of the chest    I have encouraged him to follow-up with his physicians regarding his LACY treatment    I recommend efforts at regular exercise program and weight loss    I will plan to see him back in a year or earlier if there are any problems in the meantime    Moderate level of Medical Decision Making complexity based on 1 undiagnosed new problem or 2 stable chronic conditions, independent interpretation of tests, and/or prescription drug management     Emeka Sal MD  Note electronically signed    CC: Provider, No Known

## 2023-09-18 DIAGNOSIS — R06.09 DOE (DYSPNEA ON EXERTION): ICD-10-CM

## 2023-09-18 DIAGNOSIS — R91.8 GROUND GLASS OPACITY PRESENT ON IMAGING OF LUNG: Primary | ICD-10-CM

## 2023-10-23 ENCOUNTER — OFFICE VISIT (OUTPATIENT)
Dept: CARDIOLOGY | Facility: CLINIC | Age: 43
End: 2023-10-23
Payer: COMMERCIAL

## 2023-10-23 VITALS
DIASTOLIC BLOOD PRESSURE: 72 MMHG | HEART RATE: 85 BPM | OXYGEN SATURATION: 95 % | WEIGHT: 315 LBS | SYSTOLIC BLOOD PRESSURE: 114 MMHG | HEIGHT: 72 IN | BODY MASS INDEX: 42.66 KG/M2

## 2023-10-23 DIAGNOSIS — R06.09 DOE (DYSPNEA ON EXERTION): ICD-10-CM

## 2023-10-23 DIAGNOSIS — I51.7 LVH (LEFT VENTRICULAR HYPERTROPHY): ICD-10-CM

## 2023-10-23 DIAGNOSIS — E78.5 HYPERLIPIDEMIA LDL GOAL <100: ICD-10-CM

## 2023-10-23 DIAGNOSIS — I10 ESSENTIAL HYPERTENSION: Primary | ICD-10-CM

## 2023-10-23 PROCEDURE — 3078F DIAST BP <80 MM HG: CPT | Performed by: INTERNAL MEDICINE

## 2023-10-23 PROCEDURE — 99213 OFFICE O/P EST LOW 20 MIN: CPT | Performed by: INTERNAL MEDICINE

## 2023-10-23 PROCEDURE — 3074F SYST BP LT 130 MM HG: CPT | Performed by: INTERNAL MEDICINE

## 2023-10-23 NOTE — PROGRESS NOTES
"OFFICE VISIT  NOTE  [facility] CARDIOLOGY      Name: Ziggy Liriano    Date: 10/23/2023  MRN:  [id number]  :  1980      REFERRING/PRIMARY PROVIDER:  Toma Diallo APRN    Chief Complaint   Patient presents with    Chest Pain, unspecified type       HPI: Ziggy Liriano is a 42 y.o. male who presents today for left ventricular hypertrophy and hypertension.  He reports increased lower extremity edema after having Covid in 2021 and then had COVID again 2021 and then lower extremity was started.  Also has chest pain and pressure, had nuclear stress test at [place] 2021 it was low risk, echo showed normal EF with mild LVH.  Has gained some weight over the last 12 months but still having some swelling but overall less short of breath and no chest pain.  Although he did have an episode of chest pain after getting the COVID-vaccine.    Past Medical History:   Diagnosis Date    Arthritis     Asthma     Enlarged heart     Hypertension     Sleep apnea     \"THEY TOOK IT AWAY\" referring to his cpap mask       History reviewed. No pertinent surgical history.    Social History     Socioeconomic History    Marital status: Single   Tobacco Use    Smoking status: Former     Types: Cigarettes    Smokeless tobacco: Former     Types: Snuff   Vaping Use    Vaping Use: Never used   Substance and Sexual Activity    Alcohol use: Never    Drug use: Never    Sexual activity: Defer       Family History   Problem Relation Age of Onset    No Known Problems Mother     No Known Problems Father         ROS:   Constitutional no fever,  no weight loss   Skin no rash, no subcutaneous nodules   Otolaryngeal no difficulty swallowing   Cardiovascular See HPI   Pulmonary no cough, no sputum production   Gastrointestinal no constipation, no diarrhea   Genitourinary no dysuria, no hematuria   Hematologic no easy bruisability, no abnormal bleeding   Musculoskeletal no muscle pain   Neurologic " "no dizziness, no falls         Allergies   Allergen Reactions    Beef (Diagnostic) Unknown - Low Severity     Tested positive    Mustard Unknown - Low Severity     Allergen tested positive    Shellfish-Derived Products Unknown - Low Severity     Allergen tested positive; reports that he has had iv contrast dye and tolerated without reaction         Current Outpatient Medications:     albuterol sulfate  (90 Base) MCG/ACT inhaler, Inhale 2 puffs Every 6 (Six) Hours As Needed., Disp: , Rfl:     ALLERGY SERUM INJECTION, Inject  under the skin into the appropriate area as directed 1 (One) Time Per Week. Pt waiting for doctor to send him a new prescription, Disp: , Rfl:     busPIRone (BUSPAR) 10 MG tablet, Take 1 tablet by mouth Daily., Disp: , Rfl:     cetirizine (zyrTEC) 10 MG tablet, Take 1 tablet by mouth Daily., Disp: , Rfl:     diclofenac (VOLTAREN) 75 MG EC tablet, Take 1 tablet by mouth As Needed., Disp: , Rfl:     dicyclomine (BENTYL) 20 MG tablet, Take 1 tablet by mouth As Needed., Disp: , Rfl:     fenofibrate 160 MG tablet, Take 1 tablet by mouth Daily., Disp: , Rfl:     fluticasone (FLONASE) 50 MCG/ACT nasal spray, 2 sprays by Each Nare route Daily. Shake liquid, Disp: , Rfl:     furosemide (LASIX) 40 MG tablet, Take 1 tablet by mouth Daily., Disp: , Rfl:     hydrOXYzine (ATARAX) 25 MG tablet, Take 1 tablet by mouth Daily As Needed., Disp: , Rfl:     levocetirizine (XYZAL) 5 MG tablet, Take 1 tablet by mouth Daily., Disp: , Rfl:     losartan (COZAAR) 50 MG tablet, Take 1 tablet by mouth Daily., Disp: , Rfl:     Vitals:    10/23/23 1133   BP: 114/72   BP Location: Right arm   Patient Position: Sitting   Pulse: 85   SpO2: 95%   Weight: (!) 179 kg (394 lb)   Height: 182.9 cm (72\")     Body mass index is 53.44 kg/m².    PHYSICAL EXAM:    General Appearance:   well developed  well nourished  HENT:   oropharynx moist  lips not cyanotic  Neck:  thyroid not enlarged  supple  Respiratory:  no respiratory " "distress  normal breath sounds  no rales  Cardiovascular:  no jugular venous distention  regular rhythm  apical impulse normal  S1 normal, S2 normal  no S3, no S4   no murmur  no rub, no thrill  carotid pulses normal; no bruit  lower extremity edema: 3+ nonpitting    Musculoskeletal:  no clubbing of fingers.   normocephalic, head atraumatic  Skin:   warm, dry  Psychiatric:  judgement and insight appropriate  normal mood and affect    RESULTS:   Procedures          Labs:  Lab Results   Component Value Date    AST 36 11/26/2021    ALT 51 (H) 11/26/2021     Lab Results   Component Value Date    HGBA1C 4.6 11/26/2021     No components found for: \"CREATINININE\"  eGFR Non  Am   Date Value Ref Range Status   11/26/2021 >60 >60 mL/min/1.73m*2 Final     Comment:     eGFR = estimated GFR; eGFR units = mL/min/1.73 sq meters Chronic Kidney Disease is considered if eGFR <60 mL/min/1.73 sq meters Kidney failure is considered if eGFR is <15 mL/min/1.73 sq meters. eGFR assumes steady state plasma creatinine concentration; not applicable if renal function is rapidly changing or patient is on dialysis.   11/26/2021 >60 >60 mL/min/1.73m*2 Final     Comment:     eGFR = estimated GFR; eGFR units = mL/min/1.73 sq meters Chronic Kidney Disease is considered if eGFR <60 mL/min/1.73 sq meters Kidney failure is considered if eGFR is <15 mL/min/1.73 sq meters. eGFR assumes steady state plasma creatinine concentration; not applicable if renal function is rapidly changing or patient is on dialysis.     eGFR Non  Amer   Date Value Ref Range Status   10/02/2021 75 >60 mL/min/1.73 Final       Most recent PCP note, imaging tests, and labs reviewed.    ASSESSMENT:  Problem List Items Addressed This Visit          Cardiac and Vasculature    Essential hypertension - Primary    Hyperlipidemia LDL goal <100    LVH (left ventricular hypertrophy)    Overview     11/5/21 Echo: EF 65-70%, mild LVH         WILLS (dyspnea on exertion) "       PLAN:    1.  Concentric LVH:  Mild on echo 11/2021 at [place]  Likely secondary to uncontrolled hypertension  I advised the patient to take his medicines regularly, he misses his medicines 1-2 times per week.  Low-sodium diet increase exercise and weight loss recommended    2.  Nonpitting edema:  Continue Lasix to 60 mg daily, if needed for edema  Low-sodium diet, exercise, weight loss recommended  Continue support stockings  Weight loss strongly recommended    3.  Hypertension:  Well-controlled on current regimen of losartan, continue for now.    4.  Morbid obesity, BMI 53:  Discussed importance of decreased caloric intake specifically carbohydrates.  He may be a good candidate for semaglutide, he will discuss with PCP        Return to clinic in 12 months, or sooner as needed.    Thank you for the opportunity to share in the care of your patient; please do not hesitate to call me with any questions.     Maxwell Mendez MD, [facility]  Office: [phone] [address] [place] [address]    10/23/23

## 2023-10-24 ENCOUNTER — TELEPHONE (OUTPATIENT)
Dept: CARDIOLOGY | Facility: CLINIC | Age: 43
End: 2023-10-24

## 2023-10-24 NOTE — TELEPHONE ENCOUNTER
Caller: [facility]    Relationship: Provider    Best call back number:     What form or medical record are you requesting: LAST PROG NOTE AND LABS    Who is requesting this form or medical record from you: [facility]    How would you like to receive the form or medical records (pick-up, mail, fax): FAX  If fax, what is the fax number: [phone]      Timeframe paperwork needed: ASAP    Additional notes:

## 2023-11-30 ENCOUNTER — OFFICE VISIT (OUTPATIENT)
Dept: BEHAVIORAL HEALTH | Facility: CLINIC | Age: 43
End: 2023-11-30
Payer: COMMERCIAL

## 2023-11-30 ENCOUNTER — DOCUMENTATION (OUTPATIENT)
Dept: BARIATRICS/WEIGHT MGMT | Facility: CLINIC | Age: 43
End: 2023-11-30
Payer: COMMERCIAL

## 2023-11-30 ENCOUNTER — OFFICE VISIT (OUTPATIENT)
Dept: BARIATRICS/WEIGHT MGMT | Facility: CLINIC | Age: 43
End: 2023-11-30
Payer: COMMERCIAL

## 2023-11-30 VITALS
SYSTOLIC BLOOD PRESSURE: 134 MMHG | TEMPERATURE: 96.8 F | HEIGHT: 72 IN | BODY MASS INDEX: 42.66 KG/M2 | HEART RATE: 86 BPM | WEIGHT: 315 LBS | DIASTOLIC BLOOD PRESSURE: 90 MMHG

## 2023-11-30 DIAGNOSIS — I10 ESSENTIAL HYPERTENSION: ICD-10-CM

## 2023-11-30 DIAGNOSIS — E66.01 MORBID OBESITY WITH BMI OF 50.0-59.9, ADULT: Primary | ICD-10-CM

## 2023-11-30 DIAGNOSIS — Z55.0 UNABLE TO READ OR WRITE: ICD-10-CM

## 2023-11-30 DIAGNOSIS — G47.30 SLEEP APNEA, UNSPECIFIED TYPE: ICD-10-CM

## 2023-11-30 DIAGNOSIS — E78.5 HYPERLIPIDEMIA, UNSPECIFIED HYPERLIPIDEMIA TYPE: ICD-10-CM

## 2023-11-30 DIAGNOSIS — F41.9 ANXIETY AND DEPRESSION: ICD-10-CM

## 2023-11-30 DIAGNOSIS — R12 HEARTBURN: ICD-10-CM

## 2023-11-30 DIAGNOSIS — G89.29 OTHER CHRONIC PAIN: ICD-10-CM

## 2023-11-30 DIAGNOSIS — F32.A ANXIETY AND DEPRESSION: ICD-10-CM

## 2023-11-30 DIAGNOSIS — Z71.89 ENCOUNTER FOR PSYCHOLOGICAL ASSESSMENT PRIOR TO BARIATRIC SURGERY: ICD-10-CM

## 2023-11-30 PROBLEM — R60.9 EDEMA: Status: ACTIVE | Noted: 2023-11-30

## 2023-11-30 PROBLEM — R91.1 LESION OF RIGHT LUNG: Status: ACTIVE | Noted: 2023-11-30

## 2023-11-30 PROBLEM — M25.50 JOINT PAIN: Status: ACTIVE | Noted: 2023-11-30

## 2023-11-30 PROCEDURE — 1159F MED LIST DOCD IN RCRD: CPT | Performed by: PHYSICIAN ASSISTANT

## 2023-11-30 PROCEDURE — 99204 OFFICE O/P NEW MOD 45 MIN: CPT | Performed by: PHYSICIAN ASSISTANT

## 2023-11-30 PROCEDURE — 1160F RVW MEDS BY RX/DR IN RCRD: CPT | Performed by: PSYCHOLOGIST

## 2023-11-30 PROCEDURE — 90791 PSYCH DIAGNOSTIC EVALUATION: CPT | Performed by: PSYCHOLOGIST

## 2023-11-30 PROCEDURE — 1160F RVW MEDS BY RX/DR IN RCRD: CPT | Performed by: PHYSICIAN ASSISTANT

## 2023-11-30 PROCEDURE — 3075F SYST BP GE 130 - 139MM HG: CPT | Performed by: PHYSICIAN ASSISTANT

## 2023-11-30 PROCEDURE — 1159F MED LIST DOCD IN RCRD: CPT | Performed by: PSYCHOLOGIST

## 2023-11-30 PROCEDURE — 3080F DIAST BP >= 90 MM HG: CPT | Performed by: PHYSICIAN ASSISTANT

## 2023-11-30 RX ORDER — SODIUM CHLORIDE 0.9 % (FLUSH) 0.9 %
3 SYRINGE (ML) INJECTION EVERY 12 HOURS SCHEDULED
OUTPATIENT
Start: 2023-11-30

## 2023-11-30 RX ORDER — SODIUM CHLORIDE 9 MG/ML
40 INJECTION, SOLUTION INTRAVENOUS AS NEEDED
OUTPATIENT
Start: 2023-11-30

## 2023-11-30 RX ORDER — LOSARTAN POTASSIUM 100 MG/1
1 TABLET ORAL DAILY
COMMUNITY
Start: 2023-10-01

## 2023-11-30 RX ORDER — SODIUM CHLORIDE 0.9 % (FLUSH) 0.9 %
3-10 SYRINGE (ML) INJECTION AS NEEDED
OUTPATIENT
Start: 2023-11-30

## 2023-11-30 RX ORDER — SODIUM CHLORIDE 9 MG/ML
150 INJECTION, SOLUTION INTRAVENOUS CONTINUOUS
OUTPATIENT
Start: 2023-11-30

## 2023-11-30 SDOH — EDUCATIONAL SECURITY - EDUCATION ATTAINMENT: ILITERACY AND LOW LEVEL LITERACY: Z55.0

## 2023-11-30 NOTE — PROGRESS NOTES
"Weight Loss Surgery  Presurgical Nutrition Assessment     Ziggy Liriano  11/30/2023  [id number]  [id number]  1980   male    Surgery desired: LSG (sleeve gastrectomy)     HEIGHT 182.9 cm (72\")   WEIGHT 176 kg (387.5 #)   BMI 52.57        Highest weight ever:  211 kg  (465 #)      Allergies   Allergen Reactions    Beef (Diagnostic) Unknown - Low Severity     Tested positive    Mustard Unknown - Low Severity     Allergen tested positive    Shellfish-Derived Products Unknown - Low Severity     Allergen tested positive; reports that he has had iv contrast dye and tolerated without reaction       Current Outpatient Medications:     albuterol sulfate  (90 Base) MCG/ACT inhaler, Inhale 2 puffs Every 6 (Six) Hours As Needed., Disp: , Rfl:     ALLERGY SERUM INJECTION, Inject  under the skin into the appropriate area as directed 1 (One) Time Per Week. Pt waiting for doctor to send him a new prescription, Disp: , Rfl:     busPIRone (BUSPAR) 10 MG tablet, Take 1 tablet by mouth Daily., Disp: , Rfl:     cetirizine (zyrTEC) 10 MG tablet, Take 1 tablet by mouth Daily., Disp: , Rfl:     diclofenac (VOLTAREN) 75 MG EC tablet, Take 1 tablet by mouth As Needed., Disp: , Rfl:     dicyclomine (BENTYL) 20 MG tablet, Take 1 tablet by mouth As Needed., Disp: , Rfl:     fenofibrate 160 MG tablet, Take 1 tablet by mouth Daily., Disp: , Rfl:     fluticasone (FLONASE) 50 MCG/ACT nasal spray, 2 sprays by Each Nare route Daily. Shake liquid, Disp: , Rfl:     furosemide (LASIX) 40 MG tablet, Take 1 tablet by mouth Daily., Disp: , Rfl:     hydrOXYzine (ATARAX) 25 MG tablet, Take 1 tablet by mouth Daily As Needed., Disp: , Rfl:     levocetirizine (XYZAL) 5 MG tablet, Take 1 tablet by mouth Daily., Disp: , Rfl:     losartan (COZAAR) 50 MG tablet, Take 1 tablet by mouth Daily., Disp: , Rfl:       Subjective information: (A note at the top of Mr. Liriano's new pt packet, written by a daughter who filled out forms and food history " "for him, stated, \"I can only communicate [verbally], I can't read or write.\").  Mr  states that he had seen a dietitian in the past, but that she only gave him papers with no pictures he could understand and learn from. States he needs help learning how to eat. Also, patient states, his daughters live with him but they do not help with cooking or groceries.             Nutrition Recall   Example of Usual 24 hour intake:     Breakfast: \"I don't eat breakfast.\"  Only sometimes eats deer sausage and eggs.    Lunch: chicken breast OR occasional fast food hamburger with fries with water or a diet Coke from [employer], [employer], [employer] etc.     Dinner: chicken OR fast food, as above.  Patient states that he only eats green beans, corn or other vegetable from a can, or tomatoes.    Snacks: [employer] peanut butter cups (\"my weakness\"), chips, beef jerky, pie    Beverages of Choice: water, diet sodas    Food Allergies or Intolerances: beef, shellfish, mustard          Exercise / Activity: no personal exercise program.  None due to joint pain.      Assessment of Nutritional Adequacy, Excessive Intake or Deficiencies:        Protein intake is too low  to ensure successful weight loss.                                        Processed / simple Carbohydrate intake is: high in fast food                                       Balance of diet with a variety of fruits and vegetables is: minimal - canned corn or green beans                                                       Reliance on restaurant food including fast food is: moderate - states he eats out only weekly or bi-weekly. States his daughters provide this. occasional fast food hamburger with fries with water or a diet Coke from [employer], [employer], [employer] etc.                                                                           Ingestion of sweet beverages eg soda, sweet tea, fruit juice: not an issue - water or diet drinks only.      Education    Provided " Nutrition Guidelines for Bariatric and Metabolic Surgery   Reviewed guidelines for higher protein, limited carbohydrate diet to promote weight loss.  Encouraged patient to incorporate these principles of healthy eating.    Educated patient to wisely choose an appropriate protein supplement beverage for the post-surgery liquid diet.  Provided product guidelines and examples.    Explained importance of goal setting to help in changing eating behaviors that are not conducive to weight loss.  Specific macronutrient goals as below.   Provided follow-up options for support, including contact information for dietitians here.     Discussed importance of tracking grams of protein and carbohydrate in diet.  Web-based support information and apps for smart phones and computers given.        Nutrition Goals   Protein goal:  grams per day in three regular balanced meals and two to three high protein snacks each day, to ensure desired weight loss.   Carbohydrate goal:  100-140 grams per day  Beverage goal: Appropriate non-carbonated beverage intake.  Patient to wean self off of any sweet beverages, including soda.    Exercise Goals  Continue current exercise routine, if appropriate, and obtain approval from caregiver if physically limited for any reason.   Start activity plan per PCP/specialist advice if not currently exercising.     Recommend that team proceed with surgery and follow per protocol.   Linette Christiansen, JACE  11/30/2023  13:28 EST

## 2023-11-30 NOTE — PROGRESS NOTES
PROGRESS NOTE    Data:    Mathieu Liriano is a 43 y.o. male who met with the undersigned for a scheduled psychological evaluation from 2:45 - 3:30 pm.      Clinical Maneuvering/Intervention:      Chief complaint and history of presenting illness/Problems: struggling with obesity for several years. Despite trying different weight loss plans and diets, the pt reported being unsuccessful in losing weight. A psychological evaluation was conducted in order to assess past and current level of functioning. Areas assessed included, but were not limited to: perception of social support, perception of ability to face and deal with challenges in life (positive functioning), anxiety symptoms, depressive symptoms, perspective on beliefs/belief system, coping skills for stress, intelligence level, addiction issues, etc. Therapeutic rapport was established. Interventions conducted today were geared towards assessing the pt's readiness for weight loss surgery and identifying and psychological contraindications for undergoing such a major life change. Social support was deemed strong (specific to weight loss surgery/weight loss in this manner and in a general sense): mother, daughters, friends. Current psychological struggles were described as low, but included anxiety problems, chronic pain (e.g., swelling in feet), and financial problems. He is on disability and section 8 housing. He expressed that he cannot read/write, or work, but did say that he does [employer] for extra income. Coping skills for distress and related to undergoing a major life change such as weight loss surgery/weight loss were deemed adequate: sense of humor, pleasant, talkative, and relies on his social support system. The pt talked about the different aspects of weight loss surgery that concerned him, that he felt nervous about it, and later in the visit that he is not convinced that it is the right thing for him to do. He talked about different people who  have had it and how their experiences do not encourage him to have it. Also, he said that his mother is not convinced that he should have it either. When asked to rate himself on how ready he is mentally to have weight loss surgery, he said, about a 4 (    Past Family and Social History:      History of family mental health problems: none endorsed    Psychosocial history: treatment of psychiatric care in the past (N/A), alcohol/substance abuse treatment in the past (N/A) , alcohol/substance abuse problems (N/A), inpatient psychiatric care (N/A).    Mental Status Exam (MSE):  Hygiene:  good  Dress: normal  Attitude:  cooperative  Motor Activity: fidgety   Speech: pressured, tangential   Mood:   nervous  Affect:  congruent  Thought Processes: normal  Thought Content:  normal  Suicidal Thoughts:  not endorsed  Homicidal Thoughts:  not endorsed  Crisis Safety Plan: not needed   Hallucinations:  none      Patient's Support Network Includes:  family, friends      Progress toward goal: there is evidence to suggest that he is taking measures to improve the quality of his life including seeking weight loss surgery.       Functional Status: moderate      Prognosis: good with weight loss surgery    Evaluation, Diagnoses, and Ability/Capacity to Respond to Treatment:      The pt presented to be struggling with anxiety, depression, chronic pain and frustrations with being overweight (BMI = 52.57, morbid obesity). He states that he cannot read or write, though he did not seem to struggle to understand/respond to questions and statements in the evaluation. Speech was pressured and somewhat tangential; he was difficult to redirect at times. Results of MSE demonstrated a functional status of moderate. Strengths: asking for help, relying on his social support system, etc (see detailed list of coping skills above). Needed for growth (CPT code requirement for Weaknesses): weight loss.      From a psychological standpoint, the pt does  not yet present as a good candidate for bariatric surgery.     Treatment Plan:      Short term goals/required for weight loss surgery: As discussed in session, he is to first, perceive himself a 9 or 10 out of 10 in mental readiness for weight loss surgery (10 = as ready as he can be, 0 = not ready at all). Then he is to schedule himself for a second psychological evaluation for weight loss surgery.    Long term goals: Reach a healthy weight and in turn, feel better emotionally and physically.      Cookie Mike, PhD, LP

## 2023-11-30 NOTE — PROGRESS NOTES
"[facility] BARIATRIC SURGERY  [address]  [address]  [facility] [address] [phone]      Patient  Name:  Ziggy Liriano  :  1980      Date of Visit: 2023      Chief Complaint:  weight gain; unable to maintain weight loss      History of Present Illness:  Ziggy Liriano is a 43 y.o. male who presents today for evaluation, education and consultation regarding metabolic and bariatric surgery with Dr. Quinones.     Ziggy has been overweight his \"whole life\".  His maximum lifetime weight is 465 lbs.  Lost 100 lbs 2 year ago w/ dietary modifications, eating mostly chicken, but only maintained for 1 year.  Current weight is 387 lbs w/ BMI 52.       Disabled.  Cannot read or write.  Lives w/ his two teenage daughters.  Admits he is hesitant to proceed w/ surgery b/c he is their primary caregiver - needs to be around for them, but also says he knows that he needs to lose weight d/t his cardiac issues (LVH, HTN).        Complete history has been obtained and discussed today, as pertinent to metabolic/ bariatric surgery.     Past Medical History:   Diagnosis Date    Anxiety     Asthma     Edema     Lasix prn, no KCl    Hypertension     Joint pain     Lesion of right lung     likely benign, following w/ pulmonary, repeat CT in 1 year    LVH (left ventricular hypertrophy)     d/t HTN, follows w/ Dr. Mendez    Morbid obesity with BMI of 50.0-59.9, adult     Sleep apnea     non-compliant w/ device     History reviewed. No pertinent surgical history.    Allergies   Allergen Reactions    Beef (Diagnostic) Unknown - Low Severity     Tested positive    Mustard Unknown - Low Severity     Allergen tested positive    Shellfish-Derived Products Unknown - Low Severity     Allergen tested positive; reports that he has had iv contrast dye and tolerated without reaction       Current Outpatient Medications:     albuterol sulfate  (90 Base) MCG/ACT inhaler, Inhale 2 puffs Every 6 " (Six) Hours As Needed., Disp: , Rfl:     busPIRone (BUSPAR) 10 MG tablet, Take 1 tablet by mouth Daily., Disp: , Rfl:     cetirizine (zyrTEC) 10 MG tablet, Take 1 tablet by mouth Daily., Disp: , Rfl:     diclofenac (VOLTAREN) 75 MG EC tablet, Take 1 tablet by mouth As Needed., Disp: , Rfl:     dicyclomine (BENTYL) 20 MG tablet, Take 1 tablet by mouth As Needed., Disp: , Rfl:     fenofibrate 160 MG tablet, Take 1 tablet by mouth Daily., Disp: , Rfl:     fluticasone (FLONASE) 50 MCG/ACT nasal spray, 2 sprays by Each Nare route Daily. Shake liquid, Disp: , Rfl:     furosemide (LASIX) 40 MG tablet, Take 1 tablet by mouth Daily., Disp: , Rfl:     hydrOXYzine (ATARAX) 25 MG tablet, Take 1 tablet by mouth Daily As Needed., Disp: , Rfl:     levocetirizine (XYZAL) 5 MG tablet, Take 1 tablet by mouth Daily., Disp: , Rfl:     losartan (COZAAR) 100 MG tablet, Take 1 tablet by mouth Daily., Disp: , Rfl:     ALLERGY SERUM INJECTION, Inject  under the skin into the appropriate area as directed 1 (One) Time Per Week. Pt waiting for doctor to send him a new prescription, Disp: , Rfl:     Social History     Socioeconomic History    Marital status: Single   Tobacco Use    Smoking status: Former     Types: Cigarettes    Smokeless tobacco: Former     Types: Snuff     Quit date: 2019   Vaping Use    Vaping Use: Never used   Substance and Sexual Activity    Alcohol use: Never    Drug use: Never    Sexual activity: Never     Social History     Social History Narrative    Lives in [place] w/ 2 daughters.  On disability.         Family History   Problem Relation Age of Onset    Obesity Mother     Hypertension Mother     Obesity Father     Hypertension Father     Sleep apnea Father     Obesity Paternal Grandmother     Heart attack Paternal Grandfather     Diabetes Sister        Review of Systems:  Constitutional:  reports fatigue, weight gain and denies fevers, chills.  HEENT:  denies headache, ear pain or loss of hearing, blurred or double  vision, nasal discharge or sore throat.  Cardiovascular:  reports HTN, HLD, LVH and denies hx MI, chest pain, palpitations, hx DVT.  Respiratory:  reports sleep apnea and denies cough , wheezing, asthma, hx PE.  Gastrointestinal:  reports heartburn and denies dysphagia, nausea, vomiting, abdominal pain, IBS, gallbladder issues, liver disease.  Genitourinary:   denies history of  frequent UTI, incontinence, hematuria, dysuria, polyuria, polydipsia, renal insufficiency.    Musculoskeletal:   denies fibromyalgia, arthritis, and autoimmune disease.  Neurological:   denies numbness /tingling, dizziness, confusion, seizure, stroke.  Psychiatric:  reports hx anxiety and denies depressed mood, bipolar disorder.  Endocrine:   denies diabetes, thyroid disease.  Hematologic:   denies bruising, bleeding disorder, hx anemia, hx blood transfusion.  Skin:   denies rashes, hx MRSA.    Physical Exam:  Vital Signs:  Weight: (!) 176 kg (387 lb 9.6 oz)   Body mass index is 52.57 kg/m².  Temp: 96.8 °F (36 °C)   Heart Rate: 86   BP: 134/90     Physical Exam  Vitals reviewed.   Constitutional:       Appearance: He is well-developed.   HENT:      Head: Normocephalic and atraumatic.   Eyes:      General: No scleral icterus.     Conjunctiva/sclera: Conjunctivae normal.   Neck:      Thyroid: No thyromegaly.   Cardiovascular:      Rate and Rhythm: Normal rate and regular rhythm.      Heart sounds: No murmur heard.  Pulmonary:      Effort: Pulmonary effort is normal. No respiratory distress.      Breath sounds: Normal breath sounds. No wheezing or rales.   Abdominal:      General: Bowel sounds are normal. There is no distension.      Palpations: Abdomen is soft. There is no mass.      Tenderness: There is no abdominal tenderness.      Hernia: No hernia is present.      Comments: no surgical scars   Musculoskeletal:         General: Normal range of motion.      Cervical back: Neck supple.   Skin:     General: Skin is warm and dry.      Findings:  No rash.   Neurological:      Mental Status: He is alert and oriented to person, place, and time.      Gait: Gait normal.   Psychiatric:         Judgment: Judgment normal.         Patient Active Problem List   Diagnosis    Essential hypertension    Hyperlipidemia LDL goal <100    LVH (left ventricular hypertrophy)    Chest pain    Leg pain    Ground glass opacity Right [place] stable from 2021    WILLS (dyspnea on exertion)    LACY (obstructive sleep apnea) - non-compliant with CPAP    Anxiety    Joint pain    Morbid obesity with BMI of 50.0-59.9, adult    Edema    Lesion of right lung       Assessment:  43 y.o. male with medically complicated obesity pursuing sleeve gastrectomy.    Metabolic & Bariatric Surgery is deemed medically necessary given the following: Class 3 Severe Obesity (BMI >=40). Obesity-related health conditions include the following: obstructive sleep apnea, hypertension, dyslipidemias, and heartburn . Obesity is worsening. BMI is is above average; BMI management plan is completed. We discussed consulting a Bariatric surgeon.        Plan:  Further evaluation will include: CBC, CMP, Lipids, TSH, HgA1C, H.Pylori UBT, and EGD.    Additional clearances needed prior to surgery will include: Cardiology and Pulmonary.     Patient understands that bariatric surgery is not cosmetic surgery but rather a tool to help make a lifelong commitment to lifestyle changes including diet, exercise and behavior modifications.  The patient has been educated today on those expected postoperative lifestyle changes.  Psychological and Nutritional consultations will be completed prior to surgery.  Instructions on how to access [employer] (an internet based site w/ educational surgical videos) were given to the patient.  Recommended perioperative vitamin supplementation was reviewed.  The importance of avoiding ASA/ NSAIDS/ steroids/ tobacco/nicotine/ hormones/ immunomodulators perioperatively was discussed in detail.  All  questions/concerns have been addressed.      Further input to follow pending the above.           ASAEL Osorio

## 2023-12-02 LAB
ALBUMIN SERPL-MCNC: 4.8 G/DL (ref 4.1–5.1)
ALBUMIN/GLOB SERPL: 1.8 {RATIO} (ref 1.2–2.2)
ALP SERPL-CCNC: 59 IU/L (ref 44–121)
ALT SERPL-CCNC: 29 IU/L (ref 0–44)
AST SERPL-CCNC: 23 IU/L (ref 0–40)
BASOPHILS # BLD AUTO: 0 X10E3/UL (ref 0–0.2)
BASOPHILS NFR BLD AUTO: 1 %
BILIRUB SERPL-MCNC: 0.6 MG/DL (ref 0–1.2)
BUN SERPL-MCNC: 15 MG/DL (ref 6–24)
BUN/CREAT SERPL: 13 (ref 9–20)
CALCIUM SERPL-MCNC: 9.8 MG/DL (ref 8.7–10.2)
CHLORIDE SERPL-SCNC: 101 MMOL/L (ref 96–106)
CHOLEST SERPL-MCNC: 197 MG/DL (ref 100–199)
CO2 SERPL-SCNC: 23 MMOL/L (ref 20–29)
CREAT SERPL-MCNC: 1.13 MG/DL (ref 0.76–1.27)
EGFRCR SERPLBLD CKD-EPI 2021: 84 ML/MIN/1.73
EOSINOPHIL # BLD AUTO: 0.1 X10E3/UL (ref 0–0.4)
EOSINOPHIL NFR BLD AUTO: 1 %
ERYTHROCYTE [DISTWIDTH] IN BLOOD BY AUTOMATED COUNT: 12.4 % (ref 11.6–15.4)
GLOBULIN SER CALC-MCNC: 2.7 G/DL (ref 1.5–4.5)
GLUCOSE SERPL-MCNC: 90 MG/DL (ref 70–99)
HBA1C MFR BLD: 5 % (ref 4.8–5.6)
HCT VFR BLD AUTO: 44.7 % (ref 37.5–51)
HDLC SERPL-MCNC: 44 MG/DL
HGB BLD-MCNC: 16.2 G/DL (ref 13–17.7)
IMM GRANULOCYTES # BLD AUTO: 0 X10E3/UL (ref 0–0.1)
IMM GRANULOCYTES NFR BLD AUTO: 0 %
LDLC SERPL CALC-MCNC: 127 MG/DL (ref 0–99)
LYMPHOCYTES # BLD AUTO: 2.2 X10E3/UL (ref 0.7–3.1)
LYMPHOCYTES NFR BLD AUTO: 29 %
MCH RBC QN AUTO: 34.5 PG (ref 26.6–33)
MCHC RBC AUTO-ENTMCNC: 36.2 G/DL (ref 31.5–35.7)
MCV RBC AUTO: 95 FL (ref 79–97)
MONOCYTES # BLD AUTO: 0.6 X10E3/UL (ref 0.1–0.9)
MONOCYTES NFR BLD AUTO: 8 %
NEUTROPHILS # BLD AUTO: 4.7 X10E3/UL (ref 1.4–7)
NEUTROPHILS NFR BLD AUTO: 61 %
PLATELET # BLD AUTO: 247 X10E3/UL (ref 150–450)
POTASSIUM SERPL-SCNC: 4.8 MMOL/L (ref 3.5–5.2)
PROT SERPL-MCNC: 7.5 G/DL (ref 6–8.5)
RBC # BLD AUTO: 4.69 X10E6/UL (ref 4.14–5.8)
SODIUM SERPL-SCNC: 139 MMOL/L (ref 134–144)
TRIGL SERPL-MCNC: 147 MG/DL (ref 0–149)
TSH SERPL DL<=0.005 MIU/L-ACNC: 2.22 UIU/ML (ref 0.45–4.5)
UREA BREATH TEST QL: NEGATIVE
VLDLC SERPL CALC-MCNC: 26 MG/DL (ref 5–40)
WBC # BLD AUTO: 7.7 X10E3/UL (ref 3.4–10.8)

## 2023-12-22 ENCOUNTER — PATIENT ROUNDING (BHMG ONLY) (OUTPATIENT)
Dept: BARIATRICS/WEIGHT MGMT | Facility: CLINIC | Age: 43
End: 2023-12-22
Payer: COMMERCIAL

## 2023-12-22 NOTE — PROGRESS NOTES
A [employer] message has been sent to the patient for PATIENT ROUNDING for [facility] [place] [place] - Bariatric Surgery/[facility] [place] [place] Medical Weight Mgmt.

## 2024-01-10 ENCOUNTER — TELEPHONE (OUTPATIENT)
Dept: CARDIOLOGY | Facility: CLINIC | Age: 44
End: 2024-01-10
Payer: COMMERCIAL

## 2024-01-10 DIAGNOSIS — R06.09 DOE (DYSPNEA ON EXERTION): ICD-10-CM

## 2024-01-10 DIAGNOSIS — R07.9 CHEST PAIN, UNSPECIFIED TYPE: Primary | ICD-10-CM

## 2024-01-10 NOTE — TELEPHONE ENCOUNTER
Pt called for CC for bariatric surgery with Dr. Quinones. Not yet scheduled. Pt did report some chest pain but said that has always been there. He reported some chest pain back in 2023 and we ordered echo and stress, was not completed. Advised we will need to get stress and echo done before clearing. He is agreeable to proceed. New orders placed old orders . Advised scheduling will reach out to schedule both in [place] and once we get results back we can send clearance if both normal. Pt verbalized understanding and agreeable to plan

## 2024-01-31 ENCOUNTER — OFFICE VISIT (OUTPATIENT)
Dept: PULMONOLOGY | Facility: CLINIC | Age: 44
End: 2024-01-31
Payer: COMMERCIAL

## 2024-01-31 VITALS
DIASTOLIC BLOOD PRESSURE: 82 MMHG | HEIGHT: 72 IN | OXYGEN SATURATION: 98 % | TEMPERATURE: 98 F | WEIGHT: 315 LBS | BODY MASS INDEX: 42.66 KG/M2 | HEART RATE: 81 BPM | SYSTOLIC BLOOD PRESSURE: 132 MMHG

## 2024-01-31 DIAGNOSIS — R91.8 GROUND GLASS OPACITY PRESENT ON IMAGING OF LUNG: ICD-10-CM

## 2024-01-31 DIAGNOSIS — Z01.811 PREOPERATIVE RESPIRATORY EXAMINATION: Primary | ICD-10-CM

## 2024-01-31 DIAGNOSIS — Z23 IMMUNIZATION DUE: ICD-10-CM

## 2024-01-31 PROBLEM — R91.1 LESION OF RIGHT LUNG: Status: RESOLVED | Noted: 2023-11-30 | Resolved: 2024-01-31

## 2024-01-31 RX ORDER — OMEPRAZOLE 40 MG/1
CAPSULE, DELAYED RELEASE ORAL
COMMUNITY
Start: 2024-01-09

## 2024-01-31 NOTE — PROGRESS NOTES
"[facility] Pulmonary Follow up      Chief Complaint  Preoperative Respiratory Clearance (Follow up)    Subjective          Ziggy Liriano presents to [facility] PULMONARY [facility] CRIT[facility]ARE MEDICINE for preoperative evaluation for bariatric surgery.  He was initially seen by Dr. Sal in September of last year for some shortness of breath.  He had normal PFTs.    He also has a groundglass opacity that appears stable since 2021.  The plan is for a annual CT in July 2024.    He is currently pursuing evaluation for bariatric surgery.  He is having trouble with chronic back pain and lower extremity edema.    He denies any shortness of breath.  No cough or sputum production.  He has no underlying lung disease.      Objective     Vital Signs:   /82 (BP Location: Left arm, Patient Position: Sitting, Cuff Size: Adult)   Pulse 81   Temp 98 °F (36.7 °C)   Ht 182.9 cm (72\")   Wt (!) 183 kg (403 lb 1.6 oz)   SpO2 98% Comment: Room air at rest  BMI 54.67 kg/m²       Immunization History   Administered Date(s) Administered    COVID-19 (NILESH) 04/06/2021    COVID-19 (PFIZER) BIVALENT 12+YRS 01/19/2023    COVID-19 (PFIZER) Purple Cap Monovalent 12/15/2021    Fluzone (or Fluarix & Flulaval for VFC) >6mos 01/31/2024    Influenza Injectable Mdck Pf Quad 10/15/2020    Td (TDVAX) 08/14/1997       Physical Exam  Vitals reviewed.   Constitutional:       Appearance: He is well-developed. He is obese.   HENT:      Head: Normocephalic and atraumatic.   Eyes:      Pupils: Pupils are equal, round, and reactive to light.   Cardiovascular:      Rate and Rhythm: Normal rate and regular rhythm.      Heart sounds: No murmur heard.  Pulmonary:      Effort: Pulmonary effort is normal. No respiratory distress.      Breath sounds: Normal breath sounds. No wheezing or rales.   Abdominal:      General: Bowel sounds are normal. There is no distension.      Palpations: Abdomen is soft.   Musculoskeletal:         General: " Normal range of motion.      Cervical back: Normal range of motion and neck supple.   Skin:     General: Skin is warm and dry.      Findings: No erythema.   Neurological:      Mental Status: He is alert and oriented to person, place, and time.   Psychiatric:         Behavior: Behavior normal.          Result Review :            PFTs done in the office today:  Normal pulmonary function testing slightly decreased FVC is likely secondary to body habitus.  He was also having quite a bit of pleuritic type pain with deep breaths today, he is not sure he gave his best effort.      PA and lateral chest x-ray done the office today reviewed by me  Awaiting final MD interpretation                 Assessment and Plan    Problem List Items Addressed This Visit          Pulmonary and Pneumonias    Ground glass opacity Right [place] stable from 2021     Other Visit Diagnoses       Preoperative respiratory examination    -  Primary    Relevant Orders    XR Chest PA & Lateral    Spirometry with Diffusion Capacity & Lung Volumes (Completed)    Immunization due        Relevant Orders    Fluzone >6 Months (6873-6699) (Completed)          We went over his testing today.  He has normal pulmonary function test and no underlying pulmonary disease or respiratory complaints at this time.    He will follow-up as instructed in July after his CT scan for a stable groundglass nodule.    ARISCAT Preoperative Pulmonary Risk Index.    Age [x]   <= 50 years old (0 points)    []   51-80 years old (3 points)    []   >80 years old (16 points)       Preoperative Oxygen Saturation [x]   >= 96% (0 points)    []   91-95% (8 points)    []   <= 90% (24 points)       Other Clinical Risk Factors []   Respiratory Infection in the last month (17 points)    []   Pre-operative anemia: Hb < 10 g/dL (11 points)    []   Emergency Surgery (8 points)       Surgical Incision [x]   Upper abdominal (15 points)    []   Intrathoracic (24 points)       Duration of Surgery [x]    < 2 hours (0 points)    []   2-3 hours (16 points)    []   >3 hours (23 points)       Total Criteria Point Count: 15     ARISCAT risk index interpretation:      0-25 points: Low risk  1.6 % pulmonary complication rate.   26-44 points: Intermediate risk 13.3% pulmonary complication rate.    points: High risk 42.1 % pulmonary complication rate.     Postoperative Pulmonary Complications include but are not limited to: Respiratory Failure, Pulmonary Infection, Pleural Effusion, Atelectasis, Pneumothorax Bronchospasm, Aspiration Pneumonia.        Follow Up     Return in about 6 months (around 7/31/2024).  Patient was given instructions and counseling regarding his condition or for health maintenance advice. Please see specific information pulled into the AVS if appropriate.     I spent 36 minutes caring for Ziggy on this date of service. This time includes time spent by me in the following activities:preparing for the visit, reviewing tests, obtaining and/or reviewing a separately obtained history, performing a medically appropriate examination and/or evaluation , counseling and educating the patient/family/caregiver, ordering medications, tests, or procedures, referring and communicating with other health care professionals , documenting information in the medical record, and independently interpreting results and communicating that information with the patient/family/caregiver    Excluding time spent on other separate services such as performing procedures or test interpretation, if applicable      AILYN Dc, ACNP  [facility]  Electronically signed

## 2024-02-21 DIAGNOSIS — R06.02 SOB (SHORTNESS OF BREATH): ICD-10-CM

## 2024-02-21 DIAGNOSIS — R06.09 DOE (DYSPNEA ON EXERTION): ICD-10-CM

## 2024-02-21 DIAGNOSIS — R07.9 CHEST PAIN, UNSPECIFIED TYPE: Primary | ICD-10-CM

## 2024-02-21 DIAGNOSIS — R93.89 ABNORMAL CT OF THE CHEST: ICD-10-CM

## 2024-02-29 ENCOUNTER — APPOINTMENT (OUTPATIENT)
Dept: CARDIOLOGY | Facility: HOSPITAL | Age: 44
End: 2024-02-29
Payer: COMMERCIAL

## 2024-02-29 ENCOUNTER — HOSPITAL ENCOUNTER (OUTPATIENT)
Dept: CARDIOLOGY | Facility: HOSPITAL | Age: 44
Discharge: HOME | End: 2024-02-29
Admitting: INTERNAL MEDICINE
Payer: COMMERCIAL

## 2024-02-29 VITALS — WEIGHT: 315 LBS | HEIGHT: 72 IN | BODY MASS INDEX: 42.66 KG/M2

## 2024-02-29 DIAGNOSIS — R07.9 CHEST PAIN, UNSPECIFIED TYPE: ICD-10-CM

## 2024-02-29 DIAGNOSIS — R06.09 DOE (DYSPNEA ON EXERTION): ICD-10-CM

## 2024-02-29 PROCEDURE — 93306 TTE W/DOPPLER COMPLETE: CPT

## 2024-02-29 PROCEDURE — 25010000002 SULFUR HEXAFLUORIDE MICROSPH 60.7-25 MG RECONSTITUTED SUSPENSION: Performed by: INTERNAL MEDICINE

## 2024-02-29 RX ADMIN — SULFUR HEXAFLUORIDE 2 ML: KIT at 11:08

## 2024-03-02 LAB
BH CV ECHO MEAS - AO MAX PG: 5.7 MMHG
BH CV ECHO MEAS - AO MEAN PG: 3.6 MMHG
BH CV ECHO MEAS - AO V2 MAX: 119 CM/SEC
BH CV ECHO MEAS - AO V2 VTI: 22.6 CM
BH CV ECHO MEAS - EF(MOD-BP): 58 %
BH CV ECHO MEAS - IVS/LVPW: 1.08 CM
BH CV ECHO MEAS - IVSD: 1.08 CM
BH CV ECHO MEAS - LA DIMENSION: 4.2 CM
BH CV ECHO MEAS - LV MAX PG: 5.6 MMHG
BH CV ECHO MEAS - LV MEAN PG: 2.5 MMHG
BH CV ECHO MEAS - LV V1 MAX: 118 CM/SEC
BH CV ECHO MEAS - LV V1 VTI: 26.2 CM
BH CV ECHO MEAS - LVIDD: 5.4 CM
BH CV ECHO MEAS - LVIDS: 3.7 CM
BH CV ECHO MEAS - LVPWD: 1 CM
BH CV ECHO MEAS - MV A MAX VEL: 54 CM/SEC
BH CV ECHO MEAS - MV DEC SLOPE: 599 CM/SEC2
BH CV ECHO MEAS - MV E MAX VEL: 75 CM/SEC
BH CV ECHO MEAS - MV E/A: 1.39
BH CV ECHO MEAS - PA ACC TIME: 0.13 SEC
BH CV ECHO MEAS - PA V2 MAX: 98 CM/SEC
BH CV ECHO MEAS - RAP SYSTOLE: 3 MMHG

## 2024-03-07 DIAGNOSIS — R06.09 DOE (DYSPNEA ON EXERTION): Primary | ICD-10-CM

## 2024-03-07 DIAGNOSIS — R07.9 CHEST PAIN, UNSPECIFIED TYPE: ICD-10-CM

## 2024-03-19 ENCOUNTER — HOSPITAL ENCOUNTER (OUTPATIENT)
Dept: CARDIOLOGY | Facility: HOSPITAL | Age: 44
Discharge: HOME | End: 2024-03-19
Admitting: INTERNAL MEDICINE
Payer: COMMERCIAL

## 2024-03-19 ENCOUNTER — TELEPHONE (OUTPATIENT)
Dept: CARDIOLOGY | Facility: CLINIC | Age: 44
End: 2024-03-19

## 2024-03-19 VITALS
SYSTOLIC BLOOD PRESSURE: 126 MMHG | HEART RATE: 78 BPM | BODY MASS INDEX: 42.66 KG/M2 | WEIGHT: 315 LBS | DIASTOLIC BLOOD PRESSURE: 80 MMHG | HEIGHT: 72 IN

## 2024-03-19 DIAGNOSIS — R06.09 DOE (DYSPNEA ON EXERTION): ICD-10-CM

## 2024-03-19 DIAGNOSIS — R07.9 CHEST PAIN, UNSPECIFIED TYPE: ICD-10-CM

## 2024-03-19 LAB
BH CV REST NUCLEAR ISOTOPE DOSE: 26.8 MCI
BH CV STRESS BP STAGE 1: NORMAL
BH CV STRESS BP STAGE 2: NORMAL
BH CV STRESS BP STAGE 4: NORMAL
BH CV STRESS COMMENTS STAGE 1: NORMAL
BH CV STRESS DOSE REGADENOSON STAGE 1: 0.4
BH CV STRESS DURATION MIN STAGE 1: 1
BH CV STRESS DURATION MIN STAGE 2: 1
BH CV STRESS DURATION MIN STAGE 3: 1
BH CV STRESS DURATION MIN STAGE 4: 1
BH CV STRESS HR STAGE 1: 104
BH CV STRESS HR STAGE 2: 96
BH CV STRESS HR STAGE 3: 88
BH CV STRESS HR STAGE 4: 86
BH CV STRESS NUCLEAR ISOTOPE DOSE: 26.8 MCI
BH CV STRESS O2 STAGE 1: 96
BH CV STRESS O2 STAGE 2: 99
BH CV STRESS O2 STAGE 3: 98
BH CV STRESS PROTOCOL 1: NORMAL
BH CV STRESS RECOVERY BP: NORMAL MMHG
BH CV STRESS RECOVERY HR: 81 BPM
BH CV STRESS RECOVERY O2: 96 %
BH CV STRESS STAGE 1: 1
BH CV STRESS STAGE 2: 2
BH CV STRESS STAGE 3: 3
BH CV STRESS STAGE 4: 4
LV EF NUC BP: 61 %
MAXIMAL PREDICTED HEART RATE: 179 BPM
PERCENT MAX PREDICTED HR: 57.54 %
STRESS BASELINE BP: NORMAL MMHG
STRESS BASELINE HR: 75 BPM
STRESS O2 SAT REST: 97 %
STRESS PERCENT HR: 68 %
STRESS POST EXERCISE DUR MIN: 4 MIN
STRESS POST EXERCISE DUR SEC: 0 SEC
STRESS POST O2 SAT PEAK: 96 %
STRESS POST PEAK BP: NORMAL MMHG
STRESS POST PEAK HR: 103 BPM
STRESS TARGET HR: 152 BPM

## 2024-03-19 PROCEDURE — 0 RUBIDIUM CHLORIDE: Performed by: INTERNAL MEDICINE

## 2024-03-19 PROCEDURE — 78431 MYOCRD IMG PET RST&STRS CT: CPT | Performed by: INTERNAL MEDICINE

## 2024-03-19 PROCEDURE — 78431 MYOCRD IMG PET RST&STRS CT: CPT

## 2024-03-19 PROCEDURE — 93017 CV STRESS TEST TRACING ONLY: CPT

## 2024-03-19 PROCEDURE — 93018 CV STRESS TEST I&R ONLY: CPT | Performed by: INTERNAL MEDICINE

## 2024-03-19 PROCEDURE — 25010000002 REGADENOSON 0.4 MG/5ML SOLUTION: Performed by: INTERNAL MEDICINE

## 2024-03-19 PROCEDURE — A9555 RB82 RUBIDIUM: HCPCS | Performed by: INTERNAL MEDICINE

## 2024-03-19 RX ORDER — REGADENOSON 0.08 MG/ML
0.4 INJECTION, SOLUTION INTRAVENOUS ONCE
Status: COMPLETED | OUTPATIENT
Start: 2024-03-19 | End: 2024-03-19

## 2024-03-19 RX ADMIN — RUBIDIUM CHLORIDE RB-82 1 DOSE: 150 INJECTION, SOLUTION INTRAVENOUS at 14:39

## 2024-03-19 RX ADMIN — RUBIDIUM CHLORIDE RB-82 1 DOSE: 150 INJECTION, SOLUTION INTRAVENOUS at 14:48

## 2024-03-19 RX ADMIN — REGADENOSON 0.4 MG: 0.08 INJECTION, SOLUTION INTRAVENOUS at 14:48

## 2024-03-19 NOTE — PROGRESS NOTES
Please inform the patient of their test results. Unfortunately, he'll need a cath before we can clear him for surgery.  Start asa 81 mg daily and metoprolol 12.5 mg bid prior to cath. Thank you.

## 2024-03-19 NOTE — TELEPHONE ENCOUNTER
----- Message from Maxwell Mendez MD sent at 3/19/2024  4:16 PM EDT -----  Please inform the patient of their test results. Unfortunately, he'll need a cath before we can clear him for surgery.  Start asa 81 mg daily and metoprolol 12.5 mg bid prior to cath. Thank you.

## 2024-03-19 NOTE — TELEPHONE ENCOUNTER
"Attempted to reach pt by phone x2. Both times, it rings, and sounds like someone answers, but no one says anything after I say \"hello\" several times. I sent a [employer] message as well.   "

## 2024-04-17 PROBLEM — R94.39 ABNORMAL NUCLEAR STRESS TEST: Status: ACTIVE | Noted: 2024-03-19

## 2024-04-17 PROBLEM — I20.89 ANGINAL EQUIVALENT: Status: ACTIVE | Noted: 2024-03-19

## 2024-04-17 PROBLEM — R06.02 SOB (SHORTNESS OF BREATH): Status: ACTIVE | Noted: 2024-03-19

## 2024-04-19 ENCOUNTER — PREP FOR SURGERY (OUTPATIENT)
Dept: OTHER | Facility: HOSPITAL | Age: 44
End: 2024-04-19
Payer: COMMERCIAL

## 2024-04-19 RX ORDER — ACETAMINOPHEN 325 MG/1
650 TABLET ORAL EVERY 4 HOURS PRN
OUTPATIENT
Start: 2024-04-19

## 2024-04-19 RX ORDER — ASPIRIN 81 MG/1
81 TABLET ORAL DAILY
OUTPATIENT
Start: 2024-04-20

## 2024-04-19 RX ORDER — SODIUM CHLORIDE 9 MG/ML
40 INJECTION, SOLUTION INTRAVENOUS AS NEEDED
OUTPATIENT
Start: 2024-04-19

## 2024-04-19 RX ORDER — NITROGLYCERIN 0.4 MG/1
0.4 TABLET SUBLINGUAL
OUTPATIENT
Start: 2024-04-19

## 2024-04-19 RX ORDER — ASPIRIN 81 MG/1
324 TABLET, CHEWABLE ORAL ONCE
OUTPATIENT
Start: 2024-04-19 | End: 2024-04-19

## 2024-04-19 RX ORDER — SODIUM CHLORIDE 0.9 % (FLUSH) 0.9 %
10 SYRINGE (ML) INJECTION EVERY 12 HOURS SCHEDULED
OUTPATIENT
Start: 2024-04-19

## 2024-04-19 RX ORDER — SODIUM CHLORIDE 0.9 % (FLUSH) 0.9 %
10 SYRINGE (ML) INJECTION AS NEEDED
OUTPATIENT
Start: 2024-04-19

## 2024-04-26 ENCOUNTER — HOSPITAL ENCOUNTER (OUTPATIENT)
Facility: HOSPITAL | Age: 44
Setting detail: HOSPITAL OUTPATIENT SURGERY
Discharge: HOME | End: 2024-04-26
Attending: INTERNAL MEDICINE | Admitting: INTERNAL MEDICINE
Payer: COMMERCIAL

## 2024-04-26 VITALS
HEIGHT: 73 IN | DIASTOLIC BLOOD PRESSURE: 77 MMHG | BODY MASS INDEX: 41.75 KG/M2 | WEIGHT: 315 LBS | RESPIRATION RATE: 20 BRPM | SYSTOLIC BLOOD PRESSURE: 119 MMHG | HEART RATE: 81 BPM | TEMPERATURE: 97.6 F | OXYGEN SATURATION: 98 %

## 2024-04-26 DIAGNOSIS — R06.02 SOB (SHORTNESS OF BREATH): ICD-10-CM

## 2024-04-26 DIAGNOSIS — R07.9 CHEST PAIN, UNSPECIFIED TYPE: ICD-10-CM

## 2024-04-26 DIAGNOSIS — R06.09 DOE (DYSPNEA ON EXERTION): ICD-10-CM

## 2024-04-26 DIAGNOSIS — R94.39 ABNORMAL NUCLEAR STRESS TEST: ICD-10-CM

## 2024-04-26 DIAGNOSIS — I20.89 ANGINAL EQUIVALENT: ICD-10-CM

## 2024-04-26 LAB
ALBUMIN SERPL-MCNC: 4.1 G/DL (ref 3.5–5.2)
ALBUMIN/GLOB SERPL: 1.6 G/DL
ALP SERPL-CCNC: 58 U/L (ref 39–117)
ALT SERPL W P-5'-P-CCNC: 28 U/L (ref 1–41)
ANION GAP SERPL CALCULATED.3IONS-SCNC: 9 MMOL/L (ref 5–15)
AST SERPL-CCNC: 20 U/L (ref 1–40)
BILIRUB SERPL-MCNC: 0.3 MG/DL (ref 0–1.2)
BUN BLDA-MCNC: 14 MG/DL (ref 8–26)
BUN SERPL-MCNC: 14 MG/DL (ref 6–20)
BUN/CREAT SERPL: 15.2 (ref 7–25)
CA-I BLDA-SCNC: 1.22 MMOL/L (ref 1.2–1.32)
CALCIUM SPEC-SCNC: 8.8 MG/DL (ref 8.6–10.5)
CHLORIDE BLDA-SCNC: 103 MMOL/L (ref 98–109)
CHLORIDE SERPL-SCNC: 105 MMOL/L (ref 98–107)
CHOLEST SERPL-MCNC: 168 MG/DL (ref 0–200)
CO2 BLDA-SCNC: 25 MMOL/L (ref 24–29)
CO2 SERPL-SCNC: 27 MMOL/L (ref 22–29)
CREAT BLDA-MCNC: 1.1 MG/DL (ref 0.6–1.3)
CREAT SERPL-MCNC: 0.92 MG/DL (ref 0.76–1.27)
DEPRECATED RDW RBC AUTO: 44.6 FL (ref 37–54)
EGFRCR SERPLBLD CKD-EPI 2021: 107.2 ML/MIN/1.73
EGFRCR SERPLBLD CKD-EPI 2021: 86.5 ML/MIN/1.73
ERYTHROCYTE [DISTWIDTH] IN BLOOD BY AUTOMATED COUNT: 12.5 % (ref 12.3–15.4)
GLOBULIN UR ELPH-MCNC: 2.5 GM/DL
GLUCOSE BLDC GLUCOMTR-MCNC: 111 MG/DL (ref 70–130)
GLUCOSE SERPL-MCNC: 111 MG/DL (ref 65–99)
HCT VFR BLD AUTO: 41.9 % (ref 37.5–51)
HCT VFR BLDA CALC: 41 % (ref 38–51)
HDLC SERPL-MCNC: 43 MG/DL (ref 40–60)
HGB BLD-MCNC: 14.7 G/DL (ref 13–17.7)
HGB BLDA-MCNC: 13.9 G/DL (ref 12–17)
LDLC SERPL CALC-MCNC: 108 MG/DL (ref 0–100)
LDLC/HDLC SERPL: 2.49 {RATIO}
MCH RBC QN AUTO: 34.3 PG (ref 26.6–33)
MCHC RBC AUTO-ENTMCNC: 35.1 G/DL (ref 31.5–35.7)
MCV RBC AUTO: 97.9 FL (ref 79–97)
PLATELET # BLD AUTO: 196 10*3/MM3 (ref 140–450)
PMV BLD AUTO: 11 FL (ref 6–12)
POTASSIUM BLDA-SCNC: 4 MMOL/L (ref 3.5–4.9)
POTASSIUM SERPL-SCNC: 4.2 MMOL/L (ref 3.5–5.2)
PROT SERPL-MCNC: 6.6 G/DL (ref 6–8.5)
RBC # BLD AUTO: 4.28 10*6/MM3 (ref 4.14–5.8)
SODIUM BLD-SCNC: 141 MMOL/L (ref 138–146)
SODIUM SERPL-SCNC: 141 MMOL/L (ref 136–145)
TRIGL SERPL-MCNC: 90 MG/DL (ref 0–150)
VLDLC SERPL-MCNC: 17 MG/DL (ref 5–40)
WBC NRBC COR # BLD AUTO: 6.46 10*3/MM3 (ref 3.4–10.8)

## 2024-04-26 PROCEDURE — C1769 GUIDE WIRE: HCPCS | Performed by: INTERNAL MEDICINE

## 2024-04-26 PROCEDURE — 80047 BASIC METABLC PNL IONIZED CA: CPT

## 2024-04-26 PROCEDURE — 25010000002 MIDAZOLAM PER 1 MG: Performed by: INTERNAL MEDICINE

## 2024-04-26 PROCEDURE — 85014 HEMATOCRIT: CPT

## 2024-04-26 PROCEDURE — 80053 COMPREHEN METABOLIC PANEL: CPT | Performed by: PHYSICIAN ASSISTANT

## 2024-04-26 PROCEDURE — 25010000002 NICARDIPINE 2.5 MG/ML SOLUTION: Performed by: INTERNAL MEDICINE

## 2024-04-26 PROCEDURE — C1894 INTRO/SHEATH, NON-LASER: HCPCS | Performed by: INTERNAL MEDICINE

## 2024-04-26 PROCEDURE — 93458 L HRT ARTERY/VENTRICLE ANGIO: CPT | Performed by: INTERNAL MEDICINE

## 2024-04-26 PROCEDURE — 25010000002 FENTANYL CITRATE (PF) 50 MCG/ML SOLUTION: Performed by: INTERNAL MEDICINE

## 2024-04-26 PROCEDURE — 85027 COMPLETE CBC AUTOMATED: CPT | Performed by: PHYSICIAN ASSISTANT

## 2024-04-26 PROCEDURE — 25010000002 HEPARIN (PORCINE) PER 1000 UNITS: Performed by: INTERNAL MEDICINE

## 2024-04-26 PROCEDURE — 80061 LIPID PANEL: CPT | Performed by: PHYSICIAN ASSISTANT

## 2024-04-26 PROCEDURE — 25510000001 IOPAMIDOL PER 1 ML: Performed by: INTERNAL MEDICINE

## 2024-04-26 PROCEDURE — 25810000003 SODIUM CHLORIDE 0.9 % SOLUTION: Performed by: INTERNAL MEDICINE

## 2024-04-26 RX ORDER — NITROGLYCERIN 0.4 MG/1
0.4 TABLET SUBLINGUAL
Status: DISCONTINUED | OUTPATIENT
Start: 2024-04-26 | End: 2024-04-26 | Stop reason: HOSPADM

## 2024-04-26 RX ORDER — SODIUM CHLORIDE 0.9 % (FLUSH) 0.9 %
10 SYRINGE (ML) INJECTION EVERY 12 HOURS SCHEDULED
Status: DISCONTINUED | OUTPATIENT
Start: 2024-04-26 | End: 2024-04-26 | Stop reason: HOSPADM

## 2024-04-26 RX ORDER — MIDAZOLAM HYDROCHLORIDE 1 MG/ML
INJECTION INTRAMUSCULAR; INTRAVENOUS
Status: DISCONTINUED | OUTPATIENT
Start: 2024-04-26 | End: 2024-04-26 | Stop reason: HOSPADM

## 2024-04-26 RX ORDER — ASPIRIN 81 MG/1
81 TABLET ORAL DAILY
Status: DISCONTINUED | OUTPATIENT
Start: 2024-04-27 | End: 2024-04-26 | Stop reason: HOSPADM

## 2024-04-26 RX ORDER — SODIUM CHLORIDE 9 MG/ML
100 INJECTION, SOLUTION INTRAVENOUS CONTINUOUS
Status: DISCONTINUED | OUTPATIENT
Start: 2024-04-27 | End: 2024-04-26 | Stop reason: HOSPADM

## 2024-04-26 RX ORDER — SODIUM CHLORIDE 9 MG/ML
1 INJECTION, SOLUTION INTRAVENOUS CONTINUOUS
Status: DISCONTINUED | OUTPATIENT
Start: 2024-04-26 | End: 2024-04-26 | Stop reason: HOSPADM

## 2024-04-26 RX ORDER — NICARDIPINE HYDROCHLORIDE 2.5 MG/ML
INJECTION INTRAVENOUS
Status: DISCONTINUED | OUTPATIENT
Start: 2024-04-26 | End: 2024-04-26 | Stop reason: HOSPADM

## 2024-04-26 RX ORDER — FENTANYL CITRATE 50 UG/ML
INJECTION, SOLUTION INTRAMUSCULAR; INTRAVENOUS
Status: DISCONTINUED | OUTPATIENT
Start: 2024-04-26 | End: 2024-04-26 | Stop reason: HOSPADM

## 2024-04-26 RX ORDER — ASPIRIN 81 MG/1
324 TABLET, CHEWABLE ORAL ONCE
Status: COMPLETED | OUTPATIENT
Start: 2024-04-26 | End: 2024-04-26

## 2024-04-26 RX ORDER — SODIUM CHLORIDE 9 MG/ML
40 INJECTION, SOLUTION INTRAVENOUS AS NEEDED
Status: DISCONTINUED | OUTPATIENT
Start: 2024-04-26 | End: 2024-04-26 | Stop reason: HOSPADM

## 2024-04-26 RX ORDER — HEPARIN SODIUM 1000 [USP'U]/ML
INJECTION, SOLUTION INTRAVENOUS; SUBCUTANEOUS
Status: DISCONTINUED | OUTPATIENT
Start: 2024-04-26 | End: 2024-04-26 | Stop reason: HOSPADM

## 2024-04-26 RX ORDER — ACETAMINOPHEN 325 MG/1
650 TABLET ORAL EVERY 4 HOURS PRN
Status: DISCONTINUED | OUTPATIENT
Start: 2024-04-26 | End: 2024-04-26 | Stop reason: HOSPADM

## 2024-04-26 RX ORDER — SODIUM CHLORIDE 0.9 % (FLUSH) 0.9 %
10 SYRINGE (ML) INJECTION AS NEEDED
Status: DISCONTINUED | OUTPATIENT
Start: 2024-04-26 | End: 2024-04-26 | Stop reason: HOSPADM

## 2024-04-26 RX ORDER — LIDOCAINE HYDROCHLORIDE 10 MG/ML
INJECTION, SOLUTION EPIDURAL; INFILTRATION; INTRACAUDAL; PERINEURAL
Status: DISCONTINUED | OUTPATIENT
Start: 2024-04-26 | End: 2024-04-26 | Stop reason: HOSPADM

## 2024-04-26 RX ADMIN — SODIUM CHLORIDE 100 ML/HR: 9 INJECTION, SOLUTION INTRAVENOUS at 10:17

## 2024-04-26 RX ADMIN — ASPIRIN 81 MG CHEWABLE TABLET 324 MG: 81 TABLET CHEWABLE at 10:15

## 2024-04-26 NOTE — Clinical Note
Hemostasis started on the right radial artery. R-Band was used in achieving hemostasis. Radial compression device applied to vessel. Hemostasis achieved successfully. Closure device additional comment: 10ml

## 2024-04-26 NOTE — H&P
Pre-cardiac Catheterization History and Physical  [facility]      Patient:  Ziggy Liriano  :  1980  MRN: [id number]    PCP:  Toma Diallo APRN  PHONE:  [phone]    DATE: 2024  ID: Ziggy Liriano is a 43 y.o. male resident of [place], [place]     CC: Chest pain and Abnormal stress PET      PROBLEM LIST:   Active Hospital Problems    Diagnosis     SOB (shortness of breath)     Abnormal nuclear stress test     Anginal equivalent     WILLS (dyspnea on exertion)     Chest pain      3/19/24 Stress PET: small-to-moderate-sized, moderately severe area of ischemia located in the apex and inferior wall. Normal EF    21 Stress test: No evidence of ischemia or scar noted.       BRIEF HPI: Mr. Liriano is a 44 y/o morbidly obese WM with HTN, HLD, LVH and chest pain/ dyspnea who was evaluated in our offices. He is planning to undergo bariatric surgery, and stress PET was obtained for risk stratification. Stress PET was abnormal with small-moderate sized area of ischemia in apex and inferior wall, normal EF. Cardiac cath was recommended and he presents in this regard.     Cardiac Risk Factors: dyslipidemia, hypertension, male gender, obesity (BMI >= 30 kg/m2), and sedentary lifestyle    Allergies:      Allergies   Allergen Reactions    Beef (Diagnostic) Unknown - Low Severity     Tested positive    Mustard Unknown - Low Severity     Allergen tested positive    Shellfish-Derived Products Unknown - Low Severity     Allergen tested positive; reports that he has had iv contrast dye and tolerated without reaction       MEDICATIONS:  Current Outpatient Medications   Medication Instructions    albuterol sulfate  (90 Base) MCG/ACT inhaler 2 puffs, Inhalation, Every 6 Hours PRN    ALLERGY SERUM INJECTION Weekly    aspirin 81 mg, Oral, Daily    busPIRone (BUSPAR) 10 mg, Oral, Daily    cetirizine (ZYRTEC) 10 mg, Oral, Daily    diclofenac (VOLTAREN) 75 mg,  "Oral, As Needed    dicyclomine (BENTYL) 20 mg, Oral, As Needed    fenofibrate 160 mg, Oral, Daily    fluticasone (FLONASE) 50 MCG/ACT nasal spray 2 sprays, Each Nare, Daily, Shake liquid    furosemide (LASIX) 40 mg, Oral, Daily    hydrOXYzine (ATARAX) 25 mg, Oral, Daily PRN    levocetirizine (XYZAL) 5 MG tablet 1 tablet, Oral, Daily    losartan (COZAAR) 100 MG tablet 1 tablet, Oral, Daily    metoprolol tartrate (LOPRESSOR) 12.5 mg, Oral, 2 Times Daily    omeprazole (priLOSEC) 40 MG capsule TAKE 1 CAPSULE BY MOUTH DAILY 1 HOUR BEFORE FIRST MEAL       Past medical & surgical history, social and family history reviewed in the electronic medical record.    ROS: Pertinent positives listed in the HPI and problem list above. All others reviewed and negative.     Physical Exam:   There were no vitals taken for this visit.    Constitutional:    Alert, cooperative, in no acute distress. Morbidly obese   Neck:     No Jugular venous distention, adenopathy, or thyromegaly noted.    Heart:    Regular rhythm and normal rate, normal S1 and S2, no murmurs,gallops, rubs, or clicks. No distinct PMI noted.    Lungs:     Clear to auscultation bilaterally, respirations regular, even and unlabored    Extremities:   No gross deformities, +edema with compression stockings in place, no clubbing, or cyanosis.        Labs and Diagnostic Data:          Lab Results   Component Value Date    TRIG 147 11/30/2023    HDL 44 11/30/2023     (H) 11/30/2023    AST 23 11/30/2023    ALT 29 11/30/2023                 Stress PET 3/19/24:  Interpretation Summary         Myocardial perfusion imaging indicates a small-to-moderate-sized, moderately severe area of ischemia located in the apex and inferior wall.    Pt. denied denied chest pain, stated he got a \"little paniced\" could feel an increase in HR    Left ventricular ejection fraction is normal (Calculated EF = 61%).    REST EF = 61% STRESS EF = 69%.    Impressions are consistent with an " intermediate risk study.    Tele: SR    IMPRESSION:  44 y/o male with morbid obesity, HTN, HLD and LVH on echo who had an abnormal stress PET as above. He is planning to pursue bariatric surgery and cardiac cath was recommended for further evaluation.     PLAN:  Procedure to perform: LHC +/- CBI. Risks, benefits and alternatives to the procedure explained to the patient and he understands and wishes to proceed.       Electronically signed by Linda Dunham PA-C, 04/26/24, 9:15 AM EDT.  The risks, benefits, and alternative options of cardiac catheterization were discussed with the patient.  The risks include death, MI, stroke, infection, vascular injury requiring surgical repair and/or blood transfusion, coronary dissection, renal dysfunction/failure, allergic reaction, emergent CABG.  If PCI is needed there is a 1-2% risk of emergent CABG.  The patient is agreeable for cardiac catheterization, possible PCI or CABG. Plan is to proceed with cardiac catheterization and possible PCI.     Maxwell Mendez MD, FACC, [facility]  Interventional Cardiology  04/26/24  10:26 EDT

## 2024-04-26 NOTE — Clinical Note
Hemostasis started on the right radial artery. R-Band was used in achieving hemostasis. Radial compression device applied to vessel. Hemostasis achieved successfully. Closure device additional comment: 12ml

## 2024-04-26 NOTE — Clinical Note
A 6 fr sheath was successfully inserted with ultrasound guidance into the right radial artery. Sheath insertion not delayed.

## 2024-05-20 ENCOUNTER — TELEPHONE (OUTPATIENT)
Dept: CARDIOLOGY | Facility: CLINIC | Age: 44
End: 2024-05-20
Payer: COMMERCIAL

## 2024-05-20 NOTE — LETTER
05/21/24          Re: Ziggy Eubanks , 1980   Procedure/Surgery - Bariatric             Ziggy Eubanks Liriano, 1980 is LOW RISK  for scheduled procedure/surgery from a cardiac/EP standpoint.  Any questions please call [phone].           Sincerely,          MD Kristine Arzola RN

## 2024-05-20 NOTE — TELEPHONE ENCOUNTER
Pt requesting CC for bariatric surgery to be done at [facility], [place], operated by [facility]. He called for clearance in Jan but reported chest pain. Pt had abn stress test  3/2024, requiring cath before clearing. LHC 4/26/24 normal coronaries, echo 2/29/24 EF 56-60%. Okay to proceed?

## 2024-06-12 ENCOUNTER — PREP FOR SURGERY (OUTPATIENT)
Dept: OTHER | Facility: HOSPITAL | Age: 44
End: 2024-06-12
Payer: COMMERCIAL

## 2024-06-12 DIAGNOSIS — R12 HEARTBURN: Primary | ICD-10-CM

## 2024-06-12 RX ORDER — SODIUM CHLORIDE 0.9 % (FLUSH) 0.9 %
3-10 SYRINGE (ML) INJECTION AS NEEDED
Status: CANCELLED | OUTPATIENT
Start: 2024-06-12

## 2024-06-12 RX ORDER — SODIUM CHLORIDE 0.9 % (FLUSH) 0.9 %
3 SYRINGE (ML) INJECTION EVERY 12 HOURS SCHEDULED
Status: CANCELLED | OUTPATIENT
Start: 2024-06-12

## 2024-06-12 RX ORDER — SODIUM CHLORIDE 9 MG/ML
40 INJECTION, SOLUTION INTRAVENOUS AS NEEDED
Status: CANCELLED | OUTPATIENT
Start: 2024-06-12

## 2024-06-12 RX ORDER — SODIUM CHLORIDE 9 MG/ML
150 INJECTION, SOLUTION INTRAVENOUS CONTINUOUS
Status: CANCELLED | OUTPATIENT
Start: 2024-06-12

## 2024-06-18 ENCOUNTER — OFFICE VISIT (OUTPATIENT)
Dept: BARIATRICS/WEIGHT MGMT | Facility: CLINIC | Age: 44
End: 2024-06-18
Payer: COMMERCIAL

## 2024-06-18 VITALS
BODY MASS INDEX: 42.66 KG/M2 | OXYGEN SATURATION: 97 % | TEMPERATURE: 97.5 F | HEART RATE: 98 BPM | DIASTOLIC BLOOD PRESSURE: 78 MMHG | HEIGHT: 72 IN | SYSTOLIC BLOOD PRESSURE: 122 MMHG | RESPIRATION RATE: 20 BRPM | WEIGHT: 315 LBS

## 2024-06-18 DIAGNOSIS — R12 HEARTBURN: ICD-10-CM

## 2024-06-18 DIAGNOSIS — E66.01 MORBID OBESITY WITH BMI OF 50.0-59.9, ADULT: Primary | ICD-10-CM

## 2024-06-18 PROCEDURE — 1160F RVW MEDS BY RX/DR IN RCRD: CPT | Performed by: PHYSICIAN ASSISTANT

## 2024-06-18 PROCEDURE — 3078F DIAST BP <80 MM HG: CPT | Performed by: PHYSICIAN ASSISTANT

## 2024-06-18 PROCEDURE — 99214 OFFICE O/P EST MOD 30 MIN: CPT | Performed by: PHYSICIAN ASSISTANT

## 2024-06-18 PROCEDURE — 3074F SYST BP LT 130 MM HG: CPT | Performed by: PHYSICIAN ASSISTANT

## 2024-06-18 PROCEDURE — 1159F MED LIST DOCD IN RCRD: CPT | Performed by: PHYSICIAN ASSISTANT

## 2024-06-18 NOTE — PROGRESS NOTES
"[facility] BARIATRIC SURGERY  [address]  [address]  [facility] [id number] [phone]      Patient  Name:  Ziggy Liriano  :  1980      Date of Visit: 2024      Chief Complaint:  weight gain; unable to maintain weight loss; heartburn      History of Present Illness:  Ziggy Liriano is a 43 y.o. male pursuing MBS w/ Dr. Quinones.    Initial Intake Eval 2023:  \"Ziggy has been overweight his \"whole life\".  His maximum lifetime weight is 465 lbs.  Lost 100 lbs 2 year ago w/ dietary modifications, eating mostly chicken, but only maintained for 1 year.  Current weight is 387 lbs w/ BMI 52.       Disabled.  Cannot read or write.  Lives w/ his two teenage daughters.  Admits he is hesitant to proceed w/ surgery b/c he is their primary caregiver - needs to be around for them, but also says he knows that he needs to lose weight d/t his cardiac issues (LVH, HTN). \"    Pulmonary Clearance obtained 24 - low risk.      Cardiac Clearance obtained 24 w/ Dr. Mendez - low risk.     Notes hx heartburn, chronic/episodic, worse w/ fast food.  Takes TUMS prn.  Has PPI (Omeprazole) on his med list, but does not believe he is taking.  Denies prior endoscopic eval.  No gallbladder issues/concerns.        Past Medical History:   Diagnosis Date    Anxiety     Asthma     Edema     Lasix prn, no KCl    Heartburn     chronic/episodic, prn TUMS, denies prior eval    Hypertension     Joint pain     Lesion of right lung     likely benign, following w/ pulmonary, repeat CT in 1 year    LVH (left ventricular hypertrophy)     d/t HTN, follows w/ Dr. Mendez    Morbid obesity with BMI of 50.0-59.9, adult     Sleep apnea     non-compliant w/ device     Past Surgical History:   Procedure Laterality Date    CARDIAC CATHETERIZATION N/A 2024    Procedure: Left Heart Cath;  Surgeon: Maxwell Mendez MD;  Location: [facility] CATH INVASIVE LOCATION;  Service: Cardiovascular;  Laterality: " N/A;       Allergies   Allergen Reactions    Beef (Diagnostic) Unknown - Low Severity     Tested positive    Mustard Unknown - Low Severity     Allergen tested positive    Shellfish-Derived Products Unknown - Low Severity     Allergen tested positive; reports that he has had iv contrast dye and tolerated without reaction       Current Outpatient Medications:     albuterol sulfate  (90 Base) MCG/ACT inhaler, Inhale 2 puffs Every 6 (Six) Hours As Needed., Disp: , Rfl:     aspirin 81 MG EC tablet, Take 1 tablet by mouth Daily., Disp: 90 tablet, Rfl: 3    busPIRone (BUSPAR) 10 MG tablet, Take 1 tablet by mouth Daily., Disp: , Rfl:     cetirizine (zyrTEC) 10 MG tablet, Take 1 tablet by mouth Daily., Disp: , Rfl:     fenofibrate 160 MG tablet, Take 1 tablet by mouth Daily., Disp: , Rfl:     fluticasone (FLONASE) 50 MCG/ACT nasal spray, 2 sprays by Each Nare route Daily. Shake liquid, Disp: , Rfl:     furosemide (LASIX) 40 MG tablet, Take 1 tablet by mouth Daily., Disp: , Rfl:     hydrOXYzine (ATARAX) 25 MG tablet, Take 1 tablet by mouth Daily As Needed., Disp: , Rfl:     levocetirizine (XYZAL) 5 MG tablet, Take 1 tablet by mouth Daily., Disp: , Rfl:     losartan (COZAAR) 100 MG tablet, Take 1 tablet by mouth Daily., Disp: , Rfl:     omeprazole (priLOSEC) 40 MG capsule, TAKE 1 CAPSULE BY MOUTH DAILY 1 HOUR BEFORE FIRST MEAL, Disp: , Rfl:     Social History     Socioeconomic History    Marital status: Single   Tobacco Use    Smoking status: Former     Types: Cigarettes    Smokeless tobacco: Former     Types: Snuff     Quit date: 2019   Vaping Use    Vaping status: Never Used   Substance and Sexual Activity    Alcohol use: Never    Drug use: Never    Sexual activity: Never     Social History     Social History Narrative    Lives in [place] w/ 2 daughters.  On disability.         Family History   Problem Relation Age of Onset    Obesity Mother     Hypertension Mother     Obesity Father     Hypertension Father      Sleep apnea Father     Obesity Paternal Grandmother     Heart attack Paternal Grandfather     Diabetes Sister        Review of Systems:  Constitutional:  reports fatigue, weight gain and denies fevers, chills.  HEENT:  denies headache, ear pain or loss of hearing, blurred or double vision, nasal discharge or sore throat.  Cardiovascular:  reports HTN, HLD, LVH and denies hx MI, chest pain, palpitations, hx DVT.  Respiratory:  reports sleep apnea and denies cough , wheezing, asthma, hx PE.  Gastrointestinal:  reports heartburn and denies dysphagia, nausea, vomiting, abdominal pain, IBS, gallbladder issues, liver disease.  Genitourinary:   denies history of  frequent UTI, incontinence, hematuria, dysuria, polyuria, polydipsia, renal insufficiency.    Musculoskeletal:   denies fibromyalgia, arthritis, and autoimmune disease.  Neurological:   denies numbness /tingling, dizziness, confusion, seizure, stroke.  Psychiatric:  reports hx anxiety and denies depressed mood, bipolar disorder.  Endocrine:   denies diabetes, thyroid disease.  Hematologic:   denies bruising, bleeding disorder, hx anemia, hx blood transfusion.  Skin:   denies rashes, hx MRSA.    Physical Exam:  Vital Signs:  Weight: (!) 182 kg (402 lb)   Body mass index is 54.52 kg/m².  Temp: 97.5 °F (36.4 °C)   Heart Rate: 98   BP: 122/78     Physical Exam  Vitals reviewed.   Constitutional:       Appearance: He is well-developed.   HENT:      Head: Normocephalic and atraumatic.   Eyes:      General: No scleral icterus.     Conjunctiva/sclera: Conjunctivae normal.   Neck:      Thyroid: No thyromegaly.   Cardiovascular:      Rate and Rhythm: Normal rate and regular rhythm.      Heart sounds: No murmur heard.  Pulmonary:      Effort: Pulmonary effort is normal. No respiratory distress.      Breath sounds: Normal breath sounds. No wheezing or rales.   Abdominal:      General: Bowel sounds are normal. There is no distension.      Palpations: Abdomen is soft. There is  no mass.      Tenderness: There is no abdominal tenderness.      Hernia: No hernia is present.      Comments: no surgical scars   Musculoskeletal:         General: Normal range of motion.      Cervical back: Neck supple.   Skin:     General: Skin is warm and dry.      Findings: No rash.   Neurological:      Mental Status: He is alert and oriented to person, place, and time.      Gait: Gait normal.   Psychiatric:         Judgment: Judgment normal.         Patient Active Problem List   Diagnosis    Essential hypertension    Hyperlipidemia LDL goal <100    LVH (left ventricular hypertrophy)    Chest pain    Leg pain    Ground glass opacity Right [place] stable from 2021    WILLS (dyspnea on exertion)    LACY (obstructive sleep apnea) - non-compliant with CPAP    Anxiety    Joint pain    Morbid obesity with BMI of 50.0-59.9, adult    Edema    SOB (shortness of breath)    Abnormal nuclear stress test    Anginal equivalent    Heartburn       Assessment:  43 y.o. male with medically complicated obesity pursuing sleeve gastrectomy.      ICD-10-CM ICD-9-CM   1. Morbid obesity with BMI of 50.0-59.9, adult  E66.01 278.01    Z68.43 V85.43   2. Heartburn  R12 787.1     Metabolic & Bariatric Surgery is deemed medically necessary given the following: Class 3 Severe Obesity (BMI >=40). Obesity-related health conditions include the following: obstructive sleep apnea, hypertension, dyslipidemias, and heartburn . Obesity is worsening. BMI is is above average; BMI management plan is completed. We discussed consulting a Bariatric surgeon.        Plan:  Will schedule EGD @[facility] w/ Dr. Mills.  Risks/benefits discussed.  Additional input to follow.          ASALE Osorio

## 2024-06-18 NOTE — H&P (VIEW-ONLY)
"[facility] BARIATRIC SURGERY  [address]  [address]  [facility] [id number] [phone]      Patient  Name:  Ziggy Liriano  :  1980      Date of Visit: 2024      Chief Complaint:  weight gain; unable to maintain weight loss; heartburn      History of Present Illness:  Ziggy Liriano is a 43 y.o. male pursuing MBS w/ Dr. Quinones.    Initial Intake Eval 2023:  \"Ziggy has been overweight his \"whole life\".  His maximum lifetime weight is 465 lbs.  Lost 100 lbs 2 year ago w/ dietary modifications, eating mostly chicken, but only maintained for 1 year.  Current weight is 387 lbs w/ BMI 52.       Disabled.  Cannot read or write.  Lives w/ his two teenage daughters.  Admits he is hesitant to proceed w/ surgery b/c he is their primary caregiver - needs to be around for them, but also says he knows that he needs to lose weight d/t his cardiac issues (LVH, HTN). \"    Pulmonary Clearance obtained 24 - low risk.      Cardiac Clearance obtained 24 w/ Dr. Mendez - low risk.     Notes hx heartburn, chronic/episodic, worse w/ fast food.  Takes TUMS prn.  Has PPI (Omeprazole) on his med list, but does not believe he is taking.  Denies prior endoscopic eval.  No gallbladder issues/concerns.        Past Medical History:   Diagnosis Date    Anxiety     Asthma     Edema     Lasix prn, no KCl    Heartburn     chronic/episodic, prn TUMS, denies prior eval    Hypertension     Joint pain     Lesion of right lung     likely benign, following w/ pulmonary, repeat CT in 1 year    LVH (left ventricular hypertrophy)     d/t HTN, follows w/ Dr. Mendez    Morbid obesity with BMI of 50.0-59.9, adult     Sleep apnea     non-compliant w/ device     Past Surgical History:   Procedure Laterality Date    CARDIAC CATHETERIZATION N/A 2024    Procedure: Left Heart Cath;  Surgeon: Maxwell Mendez MD;  Location: [facility] CATH INVASIVE LOCATION;  Service: Cardiovascular;  Laterality: " N/A;       Allergies   Allergen Reactions    Beef (Diagnostic) Unknown - Low Severity     Tested positive    Mustard Unknown - Low Severity     Allergen tested positive    Shellfish-Derived Products Unknown - Low Severity     Allergen tested positive; reports that he has had iv contrast dye and tolerated without reaction       Current Outpatient Medications:     albuterol sulfate  (90 Base) MCG/ACT inhaler, Inhale 2 puffs Every 6 (Six) Hours As Needed., Disp: , Rfl:     aspirin 81 MG EC tablet, Take 1 tablet by mouth Daily., Disp: 90 tablet, Rfl: 3    busPIRone (BUSPAR) 10 MG tablet, Take 1 tablet by mouth Daily., Disp: , Rfl:     cetirizine (zyrTEC) 10 MG tablet, Take 1 tablet by mouth Daily., Disp: , Rfl:     fenofibrate 160 MG tablet, Take 1 tablet by mouth Daily., Disp: , Rfl:     fluticasone (FLONASE) 50 MCG/ACT nasal spray, 2 sprays by Each Nare route Daily. Shake liquid, Disp: , Rfl:     furosemide (LASIX) 40 MG tablet, Take 1 tablet by mouth Daily., Disp: , Rfl:     hydrOXYzine (ATARAX) 25 MG tablet, Take 1 tablet by mouth Daily As Needed., Disp: , Rfl:     levocetirizine (XYZAL) 5 MG tablet, Take 1 tablet by mouth Daily., Disp: , Rfl:     losartan (COZAAR) 100 MG tablet, Take 1 tablet by mouth Daily., Disp: , Rfl:     omeprazole (priLOSEC) 40 MG capsule, TAKE 1 CAPSULE BY MOUTH DAILY 1 HOUR BEFORE FIRST MEAL, Disp: , Rfl:     Social History     Socioeconomic History    Marital status: Single   Tobacco Use    Smoking status: Former     Types: Cigarettes    Smokeless tobacco: Former     Types: Snuff     Quit date: 2019   Vaping Use    Vaping status: Never Used   Substance and Sexual Activity    Alcohol use: Never    Drug use: Never    Sexual activity: Never     Social History     Social History Narrative    Lives in [place] w/ 2 daughters.  On disability.         Family History   Problem Relation Age of Onset    Obesity Mother     Hypertension Mother     Obesity Father     Hypertension Father      Sleep apnea Father     Obesity Paternal Grandmother     Heart attack Paternal Grandfather     Diabetes Sister        Review of Systems:  Constitutional:  reports fatigue, weight gain and denies fevers, chills.  HEENT:  denies headache, ear pain or loss of hearing, blurred or double vision, nasal discharge or sore throat.  Cardiovascular:  reports HTN, HLD, LVH and denies hx MI, chest pain, palpitations, hx DVT.  Respiratory:  reports sleep apnea and denies cough , wheezing, asthma, hx PE.  Gastrointestinal:  reports heartburn and denies dysphagia, nausea, vomiting, abdominal pain, IBS, gallbladder issues, liver disease.  Genitourinary:   denies history of  frequent UTI, incontinence, hematuria, dysuria, polyuria, polydipsia, renal insufficiency.    Musculoskeletal:   denies fibromyalgia, arthritis, and autoimmune disease.  Neurological:   denies numbness /tingling, dizziness, confusion, seizure, stroke.  Psychiatric:  reports hx anxiety and denies depressed mood, bipolar disorder.  Endocrine:   denies diabetes, thyroid disease.  Hematologic:   denies bruising, bleeding disorder, hx anemia, hx blood transfusion.  Skin:   denies rashes, hx MRSA.    Physical Exam:  Vital Signs:  Weight: (!) 182 kg (402 lb)   Body mass index is 54.52 kg/m².  Temp: 97.5 °F (36.4 °C)   Heart Rate: 98   BP: 122/78     Physical Exam  Vitals reviewed.   Constitutional:       Appearance: He is well-developed.   HENT:      Head: Normocephalic and atraumatic.   Eyes:      General: No scleral icterus.     Conjunctiva/sclera: Conjunctivae normal.   Neck:      Thyroid: No thyromegaly.   Cardiovascular:      Rate and Rhythm: Normal rate and regular rhythm.      Heart sounds: No murmur heard.  Pulmonary:      Effort: Pulmonary effort is normal. No respiratory distress.      Breath sounds: Normal breath sounds. No wheezing or rales.   Abdominal:      General: Bowel sounds are normal. There is no distension.      Palpations: Abdomen is soft. There is  no mass.      Tenderness: There is no abdominal tenderness.      Hernia: No hernia is present.      Comments: no surgical scars   Musculoskeletal:         General: Normal range of motion.      Cervical back: Neck supple.   Skin:     General: Skin is warm and dry.      Findings: No rash.   Neurological:      Mental Status: He is alert and oriented to person, place, and time.      Gait: Gait normal.   Psychiatric:         Judgment: Judgment normal.         Patient Active Problem List   Diagnosis    Essential hypertension    Hyperlipidemia LDL goal <100    LVH (left ventricular hypertrophy)    Chest pain    Leg pain    Ground glass opacity Right [place] stable from 2021    WILLS (dyspnea on exertion)    LACY (obstructive sleep apnea) - non-compliant with CPAP    Anxiety    Joint pain    Morbid obesity with BMI of 50.0-59.9, adult    Edema    SOB (shortness of breath)    Abnormal nuclear stress test    Anginal equivalent    Heartburn       Assessment:  43 y.o. male with medically complicated obesity pursuing sleeve gastrectomy.      ICD-10-CM ICD-9-CM   1. Morbid obesity with BMI of 50.0-59.9, adult  E66.01 278.01    Z68.43 V85.43   2. Heartburn  R12 787.1     Metabolic & Bariatric Surgery is deemed medically necessary given the following: Class 3 Severe Obesity (BMI >=40). Obesity-related health conditions include the following: obstructive sleep apnea, hypertension, dyslipidemias, and heartburn . Obesity is worsening. BMI is is above average; BMI management plan is completed. We discussed consulting a Bariatric surgeon.        Plan:  Will schedule EGD @[facility] w/ Dr. Mills.  Risks/benefits discussed.  Additional input to follow.          ASAEL Osorio

## 2024-07-14 ENCOUNTER — ANESTHESIA EVENT (OUTPATIENT)
Dept: GASTROENTEROLOGY | Facility: HOSPITAL | Age: 44
End: 2024-07-14
Payer: COMMERCIAL

## 2024-07-15 ENCOUNTER — HOSPITAL ENCOUNTER (OUTPATIENT)
Facility: HOSPITAL | Age: 44
Setting detail: HOSPITAL OUTPATIENT SURGERY
Discharge: HOME | End: 2024-07-15
Attending: SURGERY | Admitting: SURGERY
Payer: COMMERCIAL

## 2024-07-15 ENCOUNTER — ANESTHESIA (OUTPATIENT)
Dept: GASTROENTEROLOGY | Facility: HOSPITAL | Age: 44
End: 2024-07-15
Payer: COMMERCIAL

## 2024-07-15 VITALS
TEMPERATURE: 97.5 F | SYSTOLIC BLOOD PRESSURE: 122 MMHG | HEART RATE: 80 BPM | DIASTOLIC BLOOD PRESSURE: 82 MMHG | RESPIRATION RATE: 20 BRPM | OXYGEN SATURATION: 96 %

## 2024-07-15 DIAGNOSIS — R12 HEARTBURN: ICD-10-CM

## 2024-07-15 PROCEDURE — 25010000002 MIDAZOLAM PER 1 MG

## 2024-07-15 PROCEDURE — 88305 TISSUE EXAM BY PATHOLOGIST: CPT | Performed by: SURGERY

## 2024-07-15 PROCEDURE — 43239 EGD BIOPSY SINGLE/MULTIPLE: CPT | Performed by: SURGERY

## 2024-07-15 PROCEDURE — 25810000003 SODIUM CHLORIDE 0.9 % SOLUTION

## 2024-07-15 PROCEDURE — 25810000003 SODIUM CHLORIDE 0.9 % SOLUTION 1,000 ML FLEX CONT: Performed by: ANESTHESIOLOGY

## 2024-07-15 PROCEDURE — 25010000002 PROPOFOL 10 MG/ML EMULSION

## 2024-07-15 PROCEDURE — 25010000002 GLYCOPYRROLATE 1 MG/5ML SOLUTION

## 2024-07-15 RX ORDER — FAMOTIDINE 10 MG/ML
20 INJECTION, SOLUTION INTRAVENOUS ONCE
Status: COMPLETED | OUTPATIENT
Start: 2024-07-15 | End: 2024-07-15

## 2024-07-15 RX ORDER — SODIUM CHLORIDE 9 MG/ML
INJECTION, SOLUTION INTRAVENOUS CONTINUOUS PRN
Status: DISCONTINUED | OUTPATIENT
Start: 2024-07-15 | End: 2024-07-15 | Stop reason: SURG

## 2024-07-15 RX ORDER — MIDAZOLAM HYDROCHLORIDE 1 MG/ML
INJECTION INTRAMUSCULAR; INTRAVENOUS AS NEEDED
Status: DISCONTINUED | OUTPATIENT
Start: 2024-07-15 | End: 2024-07-15 | Stop reason: SURG

## 2024-07-15 RX ORDER — ONDANSETRON 2 MG/ML
4 INJECTION INTRAMUSCULAR; INTRAVENOUS ONCE AS NEEDED
Status: DISCONTINUED | OUTPATIENT
Start: 2024-07-15 | End: 2024-07-15 | Stop reason: HOSPADM

## 2024-07-15 RX ORDER — SODIUM CHLORIDE 9 MG/ML
40 INJECTION, SOLUTION INTRAVENOUS AS NEEDED
Status: DISCONTINUED | OUTPATIENT
Start: 2024-07-15 | End: 2024-07-15 | Stop reason: HOSPADM

## 2024-07-15 RX ORDER — LIDOCAINE HYDROCHLORIDE 10 MG/ML
0.5 INJECTION, SOLUTION EPIDURAL; INFILTRATION; INTRACAUDAL; PERINEURAL ONCE AS NEEDED
Status: DISCONTINUED | OUTPATIENT
Start: 2024-07-15 | End: 2024-07-15 | Stop reason: HOSPADM

## 2024-07-15 RX ORDER — FAMOTIDINE 20 MG/1
20 TABLET, FILM COATED ORAL ONCE
Status: DISCONTINUED | OUTPATIENT
Start: 2024-07-15 | End: 2024-07-15 | Stop reason: HOSPADM

## 2024-07-15 RX ORDER — GLYCOPYRROLATE 0.2 MG/ML
INJECTION INTRAMUSCULAR; INTRAVENOUS AS NEEDED
Status: DISCONTINUED | OUTPATIENT
Start: 2024-07-15 | End: 2024-07-15 | Stop reason: SURG

## 2024-07-15 RX ORDER — LIDOCAINE HYDROCHLORIDE 10 MG/ML
INJECTION, SOLUTION EPIDURAL; INFILTRATION; INTRACAUDAL; PERINEURAL AS NEEDED
Status: DISCONTINUED | OUTPATIENT
Start: 2024-07-15 | End: 2024-07-15 | Stop reason: SURG

## 2024-07-15 RX ORDER — SODIUM CHLORIDE, SODIUM LACTATE, POTASSIUM CHLORIDE, CALCIUM CHLORIDE 600; 310; 30; 20 MG/100ML; MG/100ML; MG/100ML; MG/100ML
9 INJECTION, SOLUTION INTRAVENOUS CONTINUOUS
Status: DISCONTINUED | OUTPATIENT
Start: 2024-07-15 | End: 2024-07-15 | Stop reason: HOSPADM

## 2024-07-15 RX ORDER — SODIUM CHLORIDE 0.9 % (FLUSH) 0.9 %
10 SYRINGE (ML) INJECTION EVERY 12 HOURS SCHEDULED
Status: DISCONTINUED | OUTPATIENT
Start: 2024-07-15 | End: 2024-07-15 | Stop reason: HOSPADM

## 2024-07-15 RX ORDER — MIDAZOLAM HYDROCHLORIDE 1 MG/ML
1 INJECTION INTRAMUSCULAR; INTRAVENOUS
Status: DISCONTINUED | OUTPATIENT
Start: 2024-07-15 | End: 2024-07-15 | Stop reason: HOSPADM

## 2024-07-15 RX ORDER — SODIUM CHLORIDE 0.9 % (FLUSH) 0.9 %
10 SYRINGE (ML) INJECTION AS NEEDED
Status: DISCONTINUED | OUTPATIENT
Start: 2024-07-15 | End: 2024-07-15 | Stop reason: HOSPADM

## 2024-07-15 RX ORDER — PROPOFOL 10 MG/ML
VIAL (ML) INTRAVENOUS AS NEEDED
Status: DISCONTINUED | OUTPATIENT
Start: 2024-07-15 | End: 2024-07-15 | Stop reason: SURG

## 2024-07-15 RX ADMIN — PROPOFOL 50 MG: 10 INJECTION, EMULSION INTRAVENOUS at 09:03

## 2024-07-15 RX ADMIN — MIDAZOLAM HYDROCHLORIDE 2 MG: 1 INJECTION, SOLUTION INTRAMUSCULAR; INTRAVENOUS at 09:00

## 2024-07-15 RX ADMIN — PROPOFOL 100 MG: 10 INJECTION, EMULSION INTRAVENOUS at 09:00

## 2024-07-15 RX ADMIN — GLYCOPYRROLATE 0.2 MG: 0.2 INJECTION INTRAMUSCULAR; INTRAVENOUS at 08:58

## 2024-07-15 RX ADMIN — SODIUM CHLORIDE: 9 INJECTION, SOLUTION INTRAVENOUS at 08:58

## 2024-07-15 RX ADMIN — SODIUM CHLORIDE 40 ML: 9 INJECTION, SOLUTION INTRAVENOUS at 08:09

## 2024-07-15 RX ADMIN — FAMOTIDINE 20 MG: 10 INJECTION, SOLUTION INTRAVENOUS at 08:08

## 2024-07-15 RX ADMIN — LIDOCAINE HYDROCHLORIDE 100 MG: 10 INJECTION, SOLUTION EPIDURAL; INFILTRATION; INTRACAUDAL; PERINEURAL at 09:00

## 2024-07-15 NOTE — OP NOTE
PROCEDURE NOTE.    Date: 07/15/24    Patient: Ziggy Liriano     Surgeon: Elsie Mills MD      Preoperative Diagnosis: GERD     Postoperative Diagnosis: GERD     Procedure Performed: Esophagogastroduodenoscopy with biopsy (CPT 00909)    Estimated Blood Loss: minimal    Complications: none    Findings: GEJ at 40 cm, no hiatal hernia     Specimens: Distal esophageal (39 cm) and antral biopsies     Indications: Ziggy Liriano is a 43 y.o. year old supermorbidly obese male who is preparing for sleeve gastrectomy.  The patient has episodic heartburn associated with fast food and takes TUMS as needed. He presents today for diagnostic endoscopy.      Notes hx heartburn, chronic/episodic, worse w/ fast food.  Takes TUMS prn.      Procedure:      After informed consent was taken, the patient was brought to the operating room and placed in the supine position. MAC was given by anesthesia staff. A bite block was placed and time out performed. A flexible endoscope was passed transorally down to the second portion of the duodenum without difficulty. The scope was slowly withdrawn and the anatomy examined with photodocumentation throughout. The duodenum was normal. The pylorus was without spasm. The antrum of the stomach was without significant gastritis. A biopsy was taken to rule out H. Pylori. The scope was retroflexed and the body, fundus, and cardia were examined. There was no abnormality and no hiatal hernia seen from this angle. The scope was withdrawn back into the esophagus after decompressing the stomach. The GE junction was at 40 cm and the distal esophagus showed no evidence of reflux esophagitis. Biopsy was taken. There was no hiatal hernia. The scope was further withdrawn. There was no other esophageal abnormality. The vocal cords were normal. The scope was withdrawn completely and the procedure concluded. The patient was awakened and taken to recovery in good condition.      Recommendations: We  will discuss biopsy results at next office appointment.

## 2024-07-15 NOTE — ANESTHESIA POSTPROCEDURE EVALUATION
Patient: Ziggy Liriano    Procedure Summary       Date: 07/15/24 Room / [facility]:  [facility]    Anesthesia Start: 0858 Anesthesia Stop: 0918    Procedure: ESOPHAGOGASTRODUODENOSCOPY WITH BIOPSY Diagnosis:       Heartburn      (Heartburn [R12])    Surgeons: Elsie Mills MD Provider: Ovidio Benitez MD    Anesthesia Type: general, MAC ASA Status: 3            Anesthesia Type: general, MAC    Vitals  Vitals Value Taken Time   /82 07/15/24 0925   Temp 97.5 °F (36.4 °C) 07/15/24 0908   Pulse 76 07/15/24 0930   Resp 20 07/15/24 0925   SpO2 97 % 07/15/24 0929   Vitals shown include unfiled device data.        Post Anesthesia Care and Evaluation      Comments: Post Hoc note : see VS from PACU RN

## 2024-07-15 NOTE — ANESTHESIA PREPROCEDURE EVALUATION
Anesthesia Evaluation     Patient summary reviewed and Nursing notes reviewed   NPO Solid Status: > 8 hours  NPO Liquid Status: > 2 hours           Airway   Mallampati: II  TM distance: >3 FB  Neck ROM: full  No difficulty expected  Dental      Pulmonary    (+) a smoker Former, cigarettes, asthma (MDI),shortness of breath, sleep apnea (no CPAP)  (-) COPD, recent URI, no home oxygen    ROS comment: Sats 95-96% RA   Cardiovascular     ECG reviewed    (+) hypertension, WILLS, hyperlipidemia  (-) past MI, CAD, angina (non cardiac), cardiac stents    ROS comment: ECG SR LVH 2022   ECHO 24  Normal     MPS 24 intermediate  CATH 2024  No CAD   SB Mendez 22    Neuro/Psych  (+) psychiatric history  (-) seizures, CVA  GI/Hepatic/Renal/Endo    (+) obesity, morbid obesity  (-) no renal disease, diabetes, no thyroid disorder    Musculoskeletal     Abdominal    Substance History      OB/GYN          Other        ROS/Med Hx Other: Labs wnl 4/24       Phys Exam Other: Beard               Anesthesia Plan    ASA 3     general and MAC     (TOP POM PFL )  intravenous induction     Anesthetic plan, risks, benefits, and alternatives have been provided, discussed and informed consent has been obtained with: patient.    Plan discussed with CRNA.      CODE STATUS:

## 2024-07-16 LAB
CYTO UR: NORMAL
LAB AP CASE REPORT: NORMAL
LAB AP CLINICAL INFORMATION: NORMAL
PATH REPORT.FINAL DX SPEC: NORMAL
PATH REPORT.GROSS SPEC: NORMAL

## 2024-10-07 ENCOUNTER — OFFICE VISIT (OUTPATIENT)
Dept: BEHAVIORAL HEALTH | Facility: CLINIC | Age: 44
End: 2024-10-07
Payer: COMMERCIAL

## 2024-10-07 DIAGNOSIS — Z55.0 LOW-LEVEL OF LITERACY: ICD-10-CM

## 2024-10-07 DIAGNOSIS — E66.01 MORBID OBESITY: Primary | ICD-10-CM

## 2024-10-07 DIAGNOSIS — R45.81 POOR SELF ESTEEM: ICD-10-CM

## 2024-10-07 DIAGNOSIS — F41.1 GAD (GENERALIZED ANXIETY DISORDER): ICD-10-CM

## 2024-10-07 DIAGNOSIS — Z71.89 ENCOUNTER FOR PSYCHOLOGICAL ASSESSMENT PRIOR TO BARIATRIC SURGERY: ICD-10-CM

## 2024-10-07 SDOH — EDUCATIONAL SECURITY - EDUCATION ATTAINMENT: ILITERACY AND LOW LEVEL LITERACY: Z55.0

## 2024-10-07 NOTE — PROGRESS NOTES
PROGRESS NOTE    Data:    Mathieu Liriano is a 43 y.o. male who met with the undersigned for a scheduled psychological evaluation from 2:00 - 2:45 pm.      Clinical Maneuvering/Intervention:      Chief complaint and history of presenting illness/Problems: struggling with obesity for several years. Despite trying different weight loss plans and diets, the pt reported being unsuccessful in losing weight. A psychological evaluation was conducted in order to assess past and current level of functioning. Areas assessed included, but were not limited to: perception of social support, perception of ability to face and deal with challenges in life (positive functioning), anxiety symptoms, depressive symptoms, perspective on beliefs/belief system, coping skills for stress, intelligence level, addiction issues, etc. Therapeutic rapport was established. Interventions conducted today were geared towards assessing the pt's readiness for weight loss surgery and identifying and psychological contraindications for undergoing such a major life change. Social support was deemed strong (specific to weight loss surgery/weight loss in this manner and in a general sense): mother, daughters, friends. Current psychological struggles were described as low to moderate: anxiety problems, chronic pain (e.g., swelling in feet), and feeling insecure.  He is on disability and section 8 housing. He expressed that he cannot read/write well, but that he is teaching himself and when feeling better health-wise (post-weight loss), he wants to get a part-time job. Coping skills for distress and related to undergoing a major life change such as weight loss surgery/weight loss were deemed strong: asking for help as needed (he asked about how to get started in counseling and this information was provided), sense of humor, pleasant, talkative, and he relies on his social support system. He expressed feeling worried enough and suffering enough with  weight problems that he feeling ready for weight loss surgery. He expressed looking forward to having more energy to play with his children and moving better in general.     Past Family and Social History:      History of family mental health problems: father (alcoholic), brother (alcoholic, bipolar)    Psychosocial history: treatment of psychiatric care in the past (N/A), alcohol/substance abuse treatment in the past (N/A) , alcohol/substance abuse problems (N/A), inpatient psychiatric care (N/A).    Mental Status Exam (MSE):  Hygiene:  good  Dress: normal  Attitude:  cooperative  Motor Activity: fidgety   Speech: pressured, tangential   Mood:   nervous  Affect:  congruent  Thought Processes: normal  Thought Content:  normal  Suicidal Thoughts:  not endorsed  Homicidal Thoughts:  not endorsed  Crisis Safety Plan: not needed   Patient's Support Network Includes:  family, friends      Progress toward goal: there is evidence to suggest that he is taking measures to improve the quality of his life including seeking weight loss surgery.       Functional Status: moderate to high      Prognosis: good with weight loss surgery    Evaluation, Diagnoses, and Ability/Capacity to Respond to Treatment:      The pt presented to be struggling with anxiety in a general sense (JESSE) and low self-esteem, largely in part to being overweight (BMI > 40.00, morbid obesity). He also cannot read/write very well, seemingly not past a  or first grade level. Results of MSE demonstrated a functional status of moderate. Strengths: asking for help, willingness to improve his quality of life, and belief in self that he can be successful with weight loss surgery, etc (see detailed list of coping skills above). Needed for growth (CPT code requirement for Weaknesses): weight loss, counseling, and marked improvement in literacy.       From a psychological standpoint, the pt presents as a good candidate for bariatric surgery. He is  motivated for the surgery, has showed readiness for the lifestyle change in terms of starting to adjust his eating habits, and seems to have appropriate expectations of how to prepare and how to live after surgery in order to lose weight successfully.    Treatment Plan:      Short term goals: Start regular counseling sessions. Start improving his health by following up with his bariatric surgeon in order to receive weight loss surgery as soon as feasible/appropriate and demonstrate success with compliance to adhering to the recommended diet. Long term goals: reach a healthy weight and experience improvement in self-esteem/alleviation of anxiety via taking control of his health. Continue with counseling as needed, improve literacy level, and get part-time work.      Cookie Mike, PhD, LP

## 2024-10-14 DIAGNOSIS — R53.83 FATIGUE, UNSPECIFIED TYPE: ICD-10-CM

## 2024-10-14 DIAGNOSIS — R06.00 DYSPNEA, UNSPECIFIED TYPE: Primary | ICD-10-CM

## 2024-10-15 ENCOUNTER — LAB (OUTPATIENT)
Dept: LAB | Facility: HOSPITAL | Age: 44
End: 2024-10-15
Payer: COMMERCIAL

## 2024-10-15 DIAGNOSIS — R53.83 FATIGUE, UNSPECIFIED TYPE: ICD-10-CM

## 2024-10-15 DIAGNOSIS — R06.00 DYSPNEA, UNSPECIFIED TYPE: ICD-10-CM

## 2024-10-15 LAB
ALBUMIN SERPL-MCNC: 4.2 G/DL (ref 3.5–5.2)
ALBUMIN/GLOB SERPL: 1.6 G/DL
ALP SERPL-CCNC: 50 U/L (ref 39–117)
ALT SERPL W P-5'-P-CCNC: 30 U/L (ref 1–41)
ANION GAP SERPL CALCULATED.3IONS-SCNC: 8 MMOL/L (ref 5–15)
AST SERPL-CCNC: 22 U/L (ref 1–40)
BILIRUB SERPL-MCNC: 0.6 MG/DL (ref 0–1.2)
BUN SERPL-MCNC: 15 MG/DL (ref 6–20)
BUN/CREAT SERPL: 14.9 (ref 7–25)
CALCIUM SPEC-SCNC: 9 MG/DL (ref 8.6–10.5)
CHLORIDE SERPL-SCNC: 105 MMOL/L (ref 98–107)
CO2 SERPL-SCNC: 25 MMOL/L (ref 22–29)
CREAT SERPL-MCNC: 1.01 MG/DL (ref 0.76–1.27)
DEPRECATED RDW RBC AUTO: 43.3 FL (ref 37–54)
EGFRCR SERPLBLD CKD-EPI 2021: 95.8 ML/MIN/1.73
ERYTHROCYTE [DISTWIDTH] IN BLOOD BY AUTOMATED COUNT: 12.3 % (ref 12.3–15.4)
GLOBULIN UR ELPH-MCNC: 2.7 GM/DL
GLUCOSE SERPL-MCNC: 96 MG/DL (ref 65–99)
HCT VFR BLD AUTO: 43 % (ref 37.5–51)
HGB BLD-MCNC: 15.2 G/DL (ref 13–17.7)
MCH RBC QN AUTO: 33.9 PG (ref 26.6–33)
MCHC RBC AUTO-ENTMCNC: 35.3 G/DL (ref 31.5–35.7)
MCV RBC AUTO: 95.8 FL (ref 79–97)
PLATELET # BLD AUTO: 190 10*3/MM3 (ref 140–450)
PMV BLD AUTO: 11.3 FL (ref 6–12)
POTASSIUM SERPL-SCNC: 4.4 MMOL/L (ref 3.5–5.2)
PROT SERPL-MCNC: 6.9 G/DL (ref 6–8.5)
RBC # BLD AUTO: 4.49 10*6/MM3 (ref 4.14–5.8)
SODIUM SERPL-SCNC: 138 MMOL/L (ref 136–145)
WBC NRBC COR # BLD AUTO: 6.06 10*3/MM3 (ref 3.4–10.8)

## 2024-10-15 PROCEDURE — 80053 COMPREHEN METABOLIC PANEL: CPT

## 2024-10-15 PROCEDURE — 36415 COLL VENOUS BLD VENIPUNCTURE: CPT

## 2024-10-15 PROCEDURE — 85027 COMPLETE CBC AUTOMATED: CPT

## 2024-10-22 ENCOUNTER — CONSULT (OUTPATIENT)
Dept: BARIATRICS/WEIGHT MGMT | Facility: CLINIC | Age: 44
End: 2024-10-22
Payer: COMMERCIAL

## 2024-10-22 VITALS
BODY MASS INDEX: 42.66 KG/M2 | SYSTOLIC BLOOD PRESSURE: 126 MMHG | OXYGEN SATURATION: 98 % | RESPIRATION RATE: 18 BRPM | HEART RATE: 98 BPM | DIASTOLIC BLOOD PRESSURE: 78 MMHG | WEIGHT: 315 LBS | HEIGHT: 72 IN | TEMPERATURE: 98.1 F

## 2024-10-22 PROBLEM — E66.01 MORBID OBESITY WITH BODY MASS INDEX (BMI) OF 50.0 TO 59.9 IN ADULT: Status: ACTIVE | Noted: 2024-10-22

## 2024-10-22 PROCEDURE — 99214 OFFICE O/P EST MOD 30 MIN: CPT | Performed by: SURGERY

## 2024-10-22 RX ORDER — PANTOPRAZOLE SODIUM 40 MG/10ML
40 INJECTION, POWDER, LYOPHILIZED, FOR SOLUTION INTRAVENOUS ONCE
OUTPATIENT
Start: 2024-10-22 | End: 2024-10-22

## 2024-10-22 RX ORDER — GABAPENTIN 100 MG/1
600 CAPSULE ORAL ONCE
OUTPATIENT
Start: 2024-10-22 | End: 2024-10-22

## 2024-10-22 RX ORDER — ACETAMINOPHEN 500 MG
1000 TABLET ORAL ONCE
OUTPATIENT
Start: 2024-10-22 | End: 2024-10-22

## 2024-10-22 RX ORDER — SODIUM CHLORIDE 0.9 % (FLUSH) 0.9 %
3 SYRINGE (ML) INJECTION EVERY 12 HOURS SCHEDULED
OUTPATIENT
Start: 2024-10-22

## 2024-10-22 RX ORDER — ENOXAPARIN SODIUM 100 MG/ML
40 INJECTION SUBCUTANEOUS ONCE
OUTPATIENT
Start: 2024-10-22

## 2024-10-22 RX ORDER — CHLORHEXIDINE GLUCONATE ORAL RINSE 1.2 MG/ML
30 SOLUTION DENTAL
OUTPATIENT
Start: 2024-10-22 | End: 2024-10-22

## 2024-10-22 RX ORDER — SODIUM CHLORIDE, SODIUM LACTATE, POTASSIUM CHLORIDE, CALCIUM CHLORIDE 600; 310; 30; 20 MG/100ML; MG/100ML; MG/100ML; MG/100ML
150 INJECTION, SOLUTION INTRAVENOUS CONTINUOUS
OUTPATIENT
Start: 2024-10-22 | End: 2024-10-23

## 2024-10-22 RX ORDER — SCOLOPAMINE TRANSDERMAL SYSTEM 1 MG/1
1 PATCH, EXTENDED RELEASE TRANSDERMAL CONTINUOUS
OUTPATIENT
Start: 2024-10-22 | End: 2024-10-25

## 2024-10-22 RX ORDER — SODIUM CHLORIDE 0.9 % (FLUSH) 0.9 %
3-10 SYRINGE (ML) INJECTION AS NEEDED
OUTPATIENT
Start: 2024-10-22

## 2024-10-22 NOTE — LETTER
2024     AILYN Veliz  [address] [facility] [address]    Patient: Ziggy Liriano   YOB: 1980   Date of Visit: 10/22/2024     Dear AILYN Veliz:       Thank you for referring Ziggy Liriano to me for evaluation. Below are the relevant portions of my assessment and plan of care.    If you have questions, please do not hesitate to call me. I look forward to following Ziggy along with you.         Sincerely,        Dave Quinones MD        CC: No Recipients    Dave Quinones MD  10/22/24 1306  Sign when Signing Visit  [facility]  [address]  [address]  [facility] [address] [phone]      Patient  Name:  Ziggy Liriano  :  1980      Date of Visit: 10/22/2024    Chief Complaint:  weight gain; unable to maintain weight loss.   Evaluate for possible metabolic and bariatric surgery    History of Present Illness:  Ziggy Liriano is a 43 y.o. male who presents today for evaluation, education and consultation regarding metabolic and bariatric surgery (MBS).  Since last seen 2024 he has gained almost 13 pounds.  The patient returns for final visit prior to metabolic and bariatric surgery specifically the sleeve gastrectomy.  Original intake evaluation Tricia DESHPANDE PA-C dated 2023 reviewed.  She notes he says he has been overweight his whole life maximum weight 465 pounds and lost 100 pounds 2 years ago with dietary modification eating mostly chicken but maintained it for only 1 year and the current weight was 387 pounds with a BMI of 52 and that he is disabled cannot read or write and lives with his 2 teenage daughters and is hesitant to proceed with surgery because he is her primary caregiver and needs to be around for them but also says he knows that he needs to lose weight due to his cardiac issues left ventricular hypertrophy and hypertension.      The patient has  had issues with morbid obesity for years and only temporary success with non-surgical methods of weight loss.  The patient is seeking LSG to help with the morbid obesity related conditions of anxiety, asthma, peripheral edema, former smoker and dipper, heartburn, hypertension, joint pain, right lung lesion, left ventricular hypertrophy secondary to hypertension, obstructive sleep apnea noncompliant with device.    43-year-old disabled gentleman from [facility].  He takes care of 2 teenage children, his older child age 17 is pregnant and due in 6 months.  He lives in an apartment and says he can smell marijuana smoke through the walls.  He no longer smokes cigarettes or dips.  He says originally he went to medical weight management but his medical card would not cover the medication.  One of his main complaints is persistent peripheral edema after getting COVID a few years ago.  He denies reflux symptoms per se but has what he describes as chest pain.  Cardiac workup including heart cath was negative.  He is illiterate and I encouraged him to have his children read labels for him so he can avoid corn syrup going forward.  He attended informed consent last night and says he found it helpful and says he has been doing all the wrong things.  He is aware at his BMI sleeve gastrectomy at least theoretically may be a first stage procedure.  He does not know why he is on baby aspirin, says he thinks our office prescribed it, he voiced understanding to avoid aspirin 1 week prior and 6 weeks after sleeve gastrectomy to minimize the risk of delayed leak and says he will comply.  He says he is intolerant of his CPAP, still has a machine and does not use it.  He denies any gallbladder symptoms.  He denies personal or family history of bleeding or clotting disorders but admits he does not really know his family history.      Past Medical History:   Diagnosis Date   • Anxiety    • Asthma    • Edema     Lasix prn, no KCl    • Former smoker    • Heartburn     chronic/episodic, prn TUMS, EGD Dr. Mills 7/24   • History of prior use of snuff    • Hypertension    • Joint pain    • Lesion of right lung     likely benign, following w/ pulmonary, repeat CT in 1 year   • LVH (left ventricular hypertrophy)     d/t HTN, follows w/ Dr. Mendez   • Morbid obesity with BMI of 50.0-59.9, adult    • Sleep apnea     non-compliant w/ device     Past Surgical History:   Procedure Laterality Date   • CARDIAC CATHETERIZATION N/A 4/26/2024    Procedure: Left Heart Cath;  Surgeon: Maxwell Mendez MD;  Location:  [facility];  Service: Cardiovascular;  Laterality: N/A;   • ENDOSCOPY N/A 7/15/2024    Procedure: ESOPHAGOGASTRODUODENOSCOPY WITH BIOPSY;  Surgeon: Elsie Mills MD;  Location:  [facility];  Service: General;  Laterality: N/A;       Allergies   Allergen Reactions   • Beef (Diagnostic) Unknown - Low Severity     Tested positive   • Mustard Unknown - Low Severity     Allergen tested positive   • Shellfish-Derived Products Unknown - Low Severity     Allergen tested positive; reports that he has had iv contrast dye and tolerated without reaction       Current Outpatient Medications:   •  albuterol sulfate  (90 Base) MCG/ACT inhaler, Inhale 2 puffs Every 6 (Six) Hours As Needed., Disp: , Rfl:   •  aspirin 81 MG EC tablet, Take 1 tablet by mouth Daily., Disp: 90 tablet, Rfl: 3  •  busPIRone (BUSPAR) 10 MG tablet, Take 1 tablet by mouth Daily., Disp: , Rfl:   •  cetirizine (zyrTEC) 10 MG tablet, Take 1 tablet by mouth Daily., Disp: , Rfl:   •  fenofibrate 160 MG tablet, Take 1 tablet by mouth Daily., Disp: , Rfl:   •  fluticasone (FLONASE) 50 MCG/ACT nasal spray, 2 sprays by Each Nare route Daily. Shake liquid, Disp: , Rfl:   •  furosemide (LASIX) 40 MG tablet, Take 1 tablet by mouth Daily., Disp: , Rfl:   •  hydrOXYzine (ATARAX) 25 MG tablet, Take 1 tablet by mouth Daily As Needed., Disp: , Rfl:   •   levocetirizine (XYZAL) 5 MG tablet, Take 1 tablet by mouth Daily., Disp: , Rfl:   •  losartan (COZAAR) 100 MG tablet, Take 1 tablet by mouth Daily., Disp: , Rfl:     Social History     Socioeconomic History   • Marital status: Single   Tobacco Use   • Smoking status: Former     Types: Cigarettes   • Smokeless tobacco: Former     Types: Snuff     Quit date: 2019   Vaping Use   • Vaping status: Never Used   Substance and Sexual Activity   • Alcohol use: Never   • Drug use: Never   • Sexual activity: Never     Family History   Problem Relation Age of Onset   • Obesity Mother    • Hypertension Mother    • Obesity Father    • Hypertension Father    • Sleep apnea Father    • Obesity Paternal Grandmother    • Heart attack Paternal Grandfather    • Diabetes Sister        Review of Systems   Constitutional:  Positive for unexpected weight gain. Negative for chills, diaphoresis, fever and unexpected weight loss.   HENT:  Negative for congestion and facial swelling.    Eyes:  Negative for blurred vision, double vision and discharge.   Respiratory:  Negative for chest tightness, shortness of breath and stridor.    Cardiovascular:  Positive for chest pain and leg swelling. Negative for palpitations.   Gastrointestinal:  Negative for blood in stool.   Endocrine: Negative for polydipsia.   Genitourinary:  Negative for hematuria.   Musculoskeletal:  Positive for arthralgias.   Skin:  Negative for color change.   Allergic/Immunologic: Negative for immunocompromised state.   Neurological:  Negative for confusion.   Psychiatric/Behavioral:  Positive for sleep disturbance. Negative for self-injury.        I have reviewed the ROS and confirm that it's accurate today.    Physical Exam:  Vital Signs:  Weight: (not recorded)   Body mass index is 54.52 kg/m².  Temp: (not recorded)   Pulse: (not recorded)   BP: (not recorded)     Physical Exam  Vitals reviewed.   Constitutional:       Appearance: He is well-developed.      Comments:  Diaphoretic   HENT:      Head: Normocephalic and atraumatic.      Nose: Nose normal.   Eyes:      Conjunctiva/sclera: Conjunctivae normal.      Pupils: Pupils are equal, round, and reactive to light.   Neck:      Thyroid: No thyromegaly.      Vascular: No carotid bruit.      Trachea: No tracheal deviation.   Cardiovascular:      Rate and Rhythm: Regular rhythm. Tachycardia present.      Heart sounds: Normal heart sounds.   Pulmonary:      Effort: Pulmonary effort is normal. No respiratory distress.      Breath sounds: Normal breath sounds.   Abdominal:      General: There is no distension.      Palpations: Abdomen is soft.      Tenderness: There is no abdominal tenderness.   Musculoskeletal:         General: No deformity. Normal range of motion.      Cervical back: Normal range of motion and neck supple.   Skin:     General: Skin is warm and dry.      Findings: No rash.      Comments: Tattoos.  Compression hose lower extremities.  Nonpitting peripheral edema without obvious lymphedema changes.   Neurological:      Mental Status: He is alert and oriented to person, place, and time.      Cranial Nerves: No cranial nerve deficit.      Coordination: Coordination normal.   Psychiatric:         Behavior: Behavior normal.         Thought Content: Thought content normal.         Judgment: Judgment normal.         Patient Active Problem List   Diagnosis   • Essential hypertension   • Hyperlipidemia LDL goal <100   • LVH (left ventricular hypertrophy)   • Chest pain   • Leg pain   • Ground glass opacity Right [place] stable from 2021   • WILLS (dyspnea on exertion)   • LACY (obstructive sleep apnea) - non-compliant with CPAP   • Anxiety   • Joint pain   • Morbid obesity with BMI of 50.0-59.9, adult   • Edema   • SOB (shortness of breath)   • Abnormal nuclear stress test   • Anginal equivalent   • Heartburn       Assessment:    Ziggy Liriano is a 43 y.o. year old male with medically complicated obesity.    Metabolic and  bariatric surgery is deemed medically necessary given the following obesity related comorbidities including anxiety, asthma, peripheral edema, former smoker and dipper, heartburn, hypertension, joint pain, right lung lesion, left ventricular hypertrophy secondary to hypertension, obstructive sleep apnea noncompliant with device with current Weight: (not recorded) and Body mass index is 54.52 kg/m²..    No diagnosis found.   Patient is aware that surgery is a tool, and that weight loss and improvement in comorbidities is not guaranteed but only seen in the context of appropriate use, follow up and physical activity.    The patient was present for an approximately a 2.5 hour discussion of the purpose of MBS, how MBS is a tool to assist in achieving weight loss goals, the most common complications and how best to avoid them, and the strategies for short and long term weight loss and improvement in comorbidities.  Ample opportunity to discuss questions was available both in group and during the time of individual examination.    I reviewed his Efrain report which is negative.  Labs dated 10/15/2024 unremarkable CMP and CBC except for an MCH of 34 of note hemoglobin 15.2.  EGD Dr. Mills dated 7/15/2024 noting GE junction at 40 cm no hiatal hernia she notes the patient has episodic heartburn associate with fast food and takes Tums as needed.  Pathology of the antrum showed reactive changes negative for H. pylori distal esophageal biopsy showed reactive changes otherwise unremarkable.  Pulmonary clearance dated 1/31/2024 Wendie ROBERTSON noting normal pulmonary function testing with slightly decreased FVC likely secondary to body habitus she noted he was also having quite a bit of pleuritic type pain with deep breaths and not sure he gave his best effort she cleared him at low risk with a 1.6% pulmonary complication rate and a total criteria point count of 15.  Psychosocial evaluation dated 10/7/2024 Cookie SHI PhD good  "candidate.  Dietitian evaluation dated 11/30/2023 Linette FRAZIER RD noting that protein intake is too low to ensure successful weight loss that intake of processed and simple carbohydrate is high in the form of fast food and he has minimal balance of fruits and vegetables fast food intake is moderate he states he will only weekly or biweekly eats fast food and sweet beverages are not a problem.  Labs dated 11/30/2023 negative H. pylori breath test unremarkable CBC except for macrocytic indices of note hemoglobin 16.2 normal hemoglobin A1c 5.0 unremarkable CMP normal lipid panel normal TSH cardiology clearance dated 5/21/2024 Maxwell Mendez MD low risk.  He notes the patient had normal stress test March 2024 and left heart cath on 4/26/2024 showed normal coronaries and echo on 2/29/2024 showed an ejection fraction of 56 to 60%.  Please see scanned records that I have reviewed and signed off on today.  All of this in addition to the patient's unique history and exam has been taken into consideration in determining their appropriate candidacy for MBS.    Complications  of laparoscopic/possible robotic gastric sleeve were discussed. The patient is well aware of the potential complications of surgery that include but not limited to bleeding, infections, deep venous thrombosis, pulmonary embolism, pulmonary complications such as pneumonia, cardiac events, hernias, small bowel obstruction, damage to the spleen or other organs, bowel injury, disfiguring scars, failure to lose weight, need for additional surgery, conversion to an open procedure, and death. Patient is also aware of complications which apply in this particular procedure that can include but are not limited to a \"leak\" at the staple line which in some instances may require conversion to gastric bypass.    The patient is aware if a hiatal hernia is encountered, it likely will be repaired.  R/B/A Rx to hiatal hernia repair were discussed as outlined in our long consent " form.  Briefly risks in addition to those for LSG include recurrent hernia, ANUPAMA, dysphagia, esophageal injury, pneumothorax, injury to the vagus nerves, injury to the thoracic duct, aorta or vena cava.    I discussed avoiding all tobacco products, nicotine,  and second hand smoke at least 2 weeks pre-operatively and 6 weeks post-operatively to minimize the risk of sleeve leak.  This included discussing the importance of avoiding even secondhand smoke as the risk of leak is increased.  Examples discussed:  Avoid going in a house or riding in a car where someone has previously smoked in the last 2 weeks and for 6 weeks postoperatively.  Avoid living in a house where someone smokes (even if it's in a separate room/patio/attached garage, etc.).   Avoid congregating with a group of people who are smoking even if it's outside.  It is OK to be around wood burning fires and barbecue.  I explained that I do not know if marijuana has a same effects but my overall recommendation is to avoid it for 2 weeks prior in 6 weeks after surgery.     Discussed the risks, benefits and alternative therapies at great length as outlined in our extensive consent forms, consent videos, and educational teaching process under the direction of the center's .    A copy of the patient's signed informed consent is on file.    R/B/A Rx discussed to postop anticoagulation incl but not limited to bleeding, drug reaction, venothromboembolic events, etc. and agreeable to taking post op  Eliquis 2.5 mg po Q 12 hrs #42        Plan:   After evaluation today I think the patient is a reasonable candidate for laparoscopic sleeve gastrectomy and EGD.  Once again given his BMI sleeve gastrectomy at least theoretically may be a first stage procedure as above.  Watch potassium perioperatively on chronic Lasix therapy.      Other issues include:   anxiety, asthma, peripheral edema, former smoker and dipper, heartburn, hypertension, joint pain,  right lung lesion, left ventricular hypertrophy secondary to hypertension, obstructive sleep apnea noncompliant with device    Thank you Toma ROBERTSON for the opportunity evaluate Mr. Liriano.      Dave Quinones MD

## 2024-10-22 NOTE — PROGRESS NOTES
[facility] GROUP BARIATRIC SURGERY  [address]  [address]  [facility] [id number] [phone]      Patient  Name:  Zgigy Liriano  :  1980      Date of Visit: 10/22/2024    Chief Complaint:  weight gain; unable to maintain weight loss.   Evaluate for possible metabolic and bariatric surgery    History of Present Illness:  Ziggy Liriano is a 43 y.o. male who presents today for evaluation, education and consultation regarding metabolic and bariatric surgery (MBS).  Since last seen 2024 he has gained almost 13 pounds.  The patient returns for final visit prior to metabolic and bariatric surgery specifically the sleeve gastrectomy.  Original intake evaluation Tricia DESHPANDE PA-C dated 2023 reviewed.  She notes he says he has been overweight his whole life maximum weight 465 pounds and lost 100 pounds 2 years ago with dietary modification eating mostly chicken but maintained it for only 1 year and the current weight was 387 pounds with a BMI of 52 and that he is disabled cannot read or write and lives with his 2 teenage daughters and is hesitant to proceed with surgery because he is her primary caregiver and needs to be around for them but also says he knows that he needs to lose weight due to his cardiac issues left ventricular hypertrophy and hypertension.      The patient has had issues with morbid obesity for years and only temporary success with non-surgical methods of weight loss.  The patient is seeking LSG to help with the morbid obesity related conditions of anxiety, asthma, peripheral edema, former smoker and dipper, heartburn, hypertension, joint pain, right lung lesion, left ventricular hypertrophy secondary to hypertension, obstructive sleep apnea noncompliant with device.    43-year-old disabled gentleman from [facility].  He takes care of 2 teenage children, his older child age 17 is pregnant and due in 6 months.  He lives in an apartment and says he  can smell marijuana smoke through the walls.  He no longer smokes cigarettes or dips.  He says originally he went to medical weight management but his medical card would not cover the medication.  One of his main complaints is persistent peripheral edema after getting COVID a few years ago.  He denies reflux symptoms per se but has what he describes as chest pain.  Cardiac workup including heart cath was negative.  He is illiterate and I encouraged him to have his children read labels for him so he can avoid corn syrup going forward.  He attended informed consent last night and says he found it helpful and says he has been doing all the wrong things.  He is aware at his BMI sleeve gastrectomy at least theoretically may be a first stage procedure.  He does not know why he is on baby aspirin, says he thinks our office prescribed it, he voiced understanding to avoid aspirin 1 week prior and 6 weeks after sleeve gastrectomy to minimize the risk of delayed leak and says he will comply.  He says he is intolerant of his CPAP, still has a machine and does not use it.  He denies any gallbladder symptoms.  He denies personal or family history of bleeding or clotting disorders but admits he does not really know his family history.      Past Medical History:   Diagnosis Date    Anxiety     Asthma     Edema     Lasix prn, no KCl    Former smoker     Heartburn     chronic/episodic, prn TUMS, EGD Dr. Mills 7/24    History of prior use of snuff     Hypertension     Joint pain     Lesion of right lung     likely benign, following w/ pulmonary, repeat CT in 1 year    LVH (left ventricular hypertrophy)     d/t HTN, follows w/ Dr. Mendez    Morbid obesity with BMI of 50.0-59.9, adult     Sleep apnea     non-compliant w/ device     Past Surgical History:   Procedure Laterality Date    CARDIAC CATHETERIZATION N/A 4/26/2024    Procedure: Left Heart Cath;  Surgeon: Maxwell Mendez MD;  Location: [facility] INVASIVE LOCATION;   Service: Cardiovascular;  Laterality: N/A;    ENDOSCOPY N/A 7/15/2024    Procedure: ESOPHAGOGASTRODUODENOSCOPY WITH BIOPSY;  Surgeon: Elsie Mills MD;  Location: [facility] ENDOSCOPY;  Service: General;  Laterality: N/A;       Allergies   Allergen Reactions    Beef (Diagnostic) Unknown - Low Severity     Tested positive    Mustard Unknown - Low Severity     Allergen tested positive    Shellfish-Derived Products Unknown - Low Severity     Allergen tested positive; reports that he has had iv contrast dye and tolerated without reaction       Current Outpatient Medications:     albuterol sulfate  (90 Base) MCG/ACT inhaler, Inhale 2 puffs Every 6 (Six) Hours As Needed., Disp: , Rfl:     aspirin 81 MG EC tablet, Take 1 tablet by mouth Daily., Disp: 90 tablet, Rfl: 3    busPIRone (BUSPAR) 10 MG tablet, Take 1 tablet by mouth Daily., Disp: , Rfl:     cetirizine (zyrTEC) 10 MG tablet, Take 1 tablet by mouth Daily., Disp: , Rfl:     fenofibrate 160 MG tablet, Take 1 tablet by mouth Daily., Disp: , Rfl:     fluticasone (FLONASE) 50 MCG/ACT nasal spray, 2 sprays by Each Nare route Daily. Shake liquid, Disp: , Rfl:     furosemide (LASIX) 40 MG tablet, Take 1 tablet by mouth Daily., Disp: , Rfl:     hydrOXYzine (ATARAX) 25 MG tablet, Take 1 tablet by mouth Daily As Needed., Disp: , Rfl:     levocetirizine (XYZAL) 5 MG tablet, Take 1 tablet by mouth Daily., Disp: , Rfl:     losartan (COZAAR) 100 MG tablet, Take 1 tablet by mouth Daily., Disp: , Rfl:     Social History     Socioeconomic History    Marital status: Single   Tobacco Use    Smoking status: Former     Types: Cigarettes    Smokeless tobacco: Former     Types: Snuff     Quit date: 2019   Vaping Use    Vaping status: Never Used   Substance and Sexual Activity    Alcohol use: Never    Drug use: Never    Sexual activity: Never     Family History   Problem Relation Age of Onset    Obesity Mother     Hypertension Mother     Obesity Father     Hypertension Father      Sleep apnea Father     Obesity Paternal Grandmother     Heart attack Paternal Grandfather     Diabetes Sister        Review of Systems   Constitutional:  Positive for unexpected weight gain. Negative for chills, diaphoresis, fever and unexpected weight loss.   HENT:  Negative for congestion and facial swelling.    Eyes:  Negative for blurred vision, double vision and discharge.   Respiratory:  Negative for chest tightness, shortness of breath and stridor.    Cardiovascular:  Positive for chest pain and leg swelling. Negative for palpitations.   Gastrointestinal:  Negative for blood in stool.   Endocrine: Negative for polydipsia.   Genitourinary:  Negative for hematuria.   Musculoskeletal:  Positive for arthralgias.   Skin:  Negative for color change.   Allergic/Immunologic: Negative for immunocompromised state.   Neurological:  Negative for confusion.   Psychiatric/Behavioral:  Positive for sleep disturbance. Negative for self-injury.        I have reviewed the ROS and confirm that it's accurate today.    Physical Exam:  Vital Signs:  Weight: (not recorded)   Body mass index is 54.52 kg/m².  Temp: (not recorded)   Pulse: (not recorded)   BP: (not recorded)     Physical Exam  Vitals reviewed.   Constitutional:       Appearance: He is well-developed.      Comments: Diaphoretic   HENT:      Head: Normocephalic and atraumatic.      Nose: Nose normal.   Eyes:      Conjunctiva/sclera: Conjunctivae normal.      Pupils: Pupils are equal, round, and reactive to light.   Neck:      Thyroid: No thyromegaly.      Vascular: No carotid bruit.      Trachea: No tracheal deviation.   Cardiovascular:      Rate and Rhythm: Regular rhythm. Tachycardia present.      Heart sounds: Normal heart sounds.   Pulmonary:      Effort: Pulmonary effort is normal. No respiratory distress.      Breath sounds: Normal breath sounds.   Abdominal:      General: There is no distension.      Palpations: Abdomen is soft.      Tenderness: There is no  abdominal tenderness.   Musculoskeletal:         General: No deformity. Normal range of motion.      Cervical back: Normal range of motion and neck supple.   Skin:     General: Skin is warm and dry.      Findings: No rash.      Comments: Tattoos.  Compression hose lower extremities.  Nonpitting peripheral edema without obvious lymphedema changes.   Neurological:      Mental Status: He is alert and oriented to person, place, and time.      Cranial Nerves: No cranial nerve deficit.      Coordination: Coordination normal.   Psychiatric:         Behavior: Behavior normal.         Thought Content: Thought content normal.         Judgment: Judgment normal.         Patient Active Problem List   Diagnosis    Essential hypertension    Hyperlipidemia LDL goal <100    LVH (left ventricular hypertrophy)    Chest pain    Leg pain    Ground glass opacity Right [place] stable from 2021    WILLS (dyspnea on exertion)    LACY (obstructive sleep apnea) - non-compliant with CPAP    Anxiety    Joint pain    Morbid obesity with BMI of 50.0-59.9, adult    Edema    SOB (shortness of breath)    Abnormal nuclear stress test    Anginal equivalent    Heartburn       Assessment:    Ziggy Liriano is a 43 y.o. year old male with medically complicated obesity.    Metabolic and bariatric surgery is deemed medically necessary given the following obesity related comorbidities including anxiety, asthma, peripheral edema, former smoker and dipper, heartburn, hypertension, joint pain, right lung lesion, left ventricular hypertrophy secondary to hypertension, obstructive sleep apnea noncompliant with device with current Weight: (not recorded) and Body mass index is 54.52 kg/m²..    No diagnosis found.   Patient is aware that surgery is a tool, and that weight loss and improvement in comorbidities is not guaranteed but only seen in the context of appropriate use, follow up and physical activity.    The patient was present for an approximately a 2.5 hour  discussion of the purpose of MBS, how MBS is a tool to assist in achieving weight loss goals, the most common complications and how best to avoid them, and the strategies for short and long term weight loss and improvement in comorbidities.  Ample opportunity to discuss questions was available both in group and during the time of individual examination.    I reviewed his Efrain report which is negative.  Labs dated 10/15/2024 unremarkable CMP and CBC except for an MCH of 34 of note hemoglobin 15.2.  EGD Dr. Mills dated 7/15/2024 noting GE junction at 40 cm no hiatal hernia she notes the patient has episodic heartburn associate with fast food and takes Tums as needed.  Pathology of the antrum showed reactive changes negative for H. pylori distal esophageal biopsy showed reactive changes otherwise unremarkable.  Pulmonary clearance dated 1/31/2024 Wendie ROBERTSON noting normal pulmonary function testing with slightly decreased FVC likely secondary to body habitus she noted he was also having quite a bit of pleuritic type pain with deep breaths and not sure he gave his best effort she cleared him at low risk with a 1.6% pulmonary complication rate and a total criteria point count of 15.  Psychosocial evaluation dated 10/7/2024 Cookie SHI PhD good candidate.  Dietitian evaluation dated 11/30/2023 Linette FRAZIER RD noting that protein intake is too low to ensure successful weight loss that intake of processed and simple carbohydrate is high in the form of fast food and he has minimal balance of fruits and vegetables fast food intake is moderate he states he will only weekly or biweekly eats fast food and sweet beverages are not a problem.  Labs dated 11/30/2023 negative H. pylori breath test unremarkable CBC except for macrocytic indices of note hemoglobin 16.2 normal hemoglobin A1c 5.0 unremarkable CMP normal lipid panel normal TSH cardiology clearance dated 5/21/2024 Maxwell Mendez MD low risk.  He notes the patient had normal  "stress test March 2024 and left heart cath on 4/26/2024 showed normal coronaries and echo on 2/29/2024 showed an ejection fraction of 56 to 60%.  Please see scanned records that I have reviewed and signed off on today.  All of this in addition to the patient's unique history and exam has been taken into consideration in determining their appropriate candidacy for MBS.    Complications  of laparoscopic/possible robotic gastric sleeve were discussed. The patient is well aware of the potential complications of surgery that include but not limited to bleeding, infections, deep venous thrombosis, pulmonary embolism, pulmonary complications such as pneumonia, cardiac events, hernias, small bowel obstruction, damage to the spleen or other organs, bowel injury, disfiguring scars, failure to lose weight, need for additional surgery, conversion to an open procedure, and death. Patient is also aware of complications which apply in this particular procedure that can include but are not limited to a \"leak\" at the staple line which in some instances may require conversion to gastric bypass.    The patient is aware if a hiatal hernia is encountered, it likely will be repaired.  R/B/A Rx to hiatal hernia repair were discussed as outlined in our long consent form.  Briefly risks in addition to those for LSG include recurrent hernia, ANUPAMA, dysphagia, esophageal injury, pneumothorax, injury to the vagus nerves, injury to the thoracic duct, aorta or vena cava.    I discussed avoiding all tobacco products, nicotine,  and second hand smoke at least 2 weeks pre-operatively and 6 weeks post-operatively to minimize the risk of sleeve leak.  This included discussing the importance of avoiding even secondhand smoke as the risk of leak is increased.  Examples discussed:  Avoid going in a house or riding in a car where someone has previously smoked in the last 2 weeks and for 6 weeks postoperatively.  Avoid living in a house where someone smokes " (even if it's in a separate room/patio/attached garage, etc.).   Avoid congregating with a group of people who are smoking even if it's outside.  It is OK to be around wood burning fires and barbecue.  I explained that I do not know if marijuana has a same effects but my overall recommendation is to avoid it for 2 weeks prior in 6 weeks after surgery.     Discussed the risks, benefits and alternative therapies at great length as outlined in our extensive consent forms, consent videos, and educational teaching process under the direction of the center's .    A copy of the patient's signed informed consent is on file.    R/B/A Rx discussed to postop anticoagulation incl but not limited to bleeding, drug reaction, venothromboembolic events, etc. and agreeable to taking post op  Eliquis 2.5 mg po Q 12 hrs #42        Plan:   After evaluation today I think the patient is a reasonable candidate for laparoscopic sleeve gastrectomy and EGD.  Once again given his BMI sleeve gastrectomy at least theoretically may be a first stage procedure as above.  Watch potassium perioperatively on chronic Lasix therapy.      Other issues include:   anxiety, asthma, peripheral edema, former smoker and dipper, heartburn, hypertension, joint pain, right lung lesion, left ventricular hypertrophy secondary to hypertension, obstructive sleep apnea noncompliant with device    Thank you Toma ROBERTSON for the opportunity evaluate Mr. Liriano.      Dave Quinones MD

## 2024-10-28 ENCOUNTER — OFFICE VISIT (OUTPATIENT)
Dept: CARDIOLOGY | Facility: CLINIC | Age: 44
End: 2024-10-28
Payer: COMMERCIAL

## 2024-10-28 VITALS
HEART RATE: 78 BPM | SYSTOLIC BLOOD PRESSURE: 128 MMHG | DIASTOLIC BLOOD PRESSURE: 82 MMHG | HEIGHT: 72 IN | BODY MASS INDEX: 42.66 KG/M2 | OXYGEN SATURATION: 97 % | WEIGHT: 315 LBS

## 2024-10-28 DIAGNOSIS — E66.01 MORBID OBESITY WITH BMI OF 50.0-59.9, ADULT: ICD-10-CM

## 2024-10-28 DIAGNOSIS — I51.7 LVH (LEFT VENTRICULAR HYPERTROPHY): Primary | ICD-10-CM

## 2024-10-28 DIAGNOSIS — R06.09 DOE (DYSPNEA ON EXERTION): ICD-10-CM

## 2024-10-28 DIAGNOSIS — I10 ESSENTIAL HYPERTENSION: ICD-10-CM

## 2024-10-28 DIAGNOSIS — E78.5 HYPERLIPIDEMIA LDL GOAL <100: ICD-10-CM

## 2024-10-28 PROCEDURE — 3079F DIAST BP 80-89 MM HG: CPT | Performed by: INTERNAL MEDICINE

## 2024-10-28 PROCEDURE — 3074F SYST BP LT 130 MM HG: CPT | Performed by: INTERNAL MEDICINE

## 2024-10-28 PROCEDURE — 99213 OFFICE O/P EST LOW 20 MIN: CPT | Performed by: INTERNAL MEDICINE

## 2024-10-28 NOTE — PROGRESS NOTES
OFFICE VISIT  NOTE  [facility] CARDIOLOGY      Name: Ziggy Liriano    Date: 10/28/2024  MRN:  [id number]  :  1980      REFERRING/PRIMARY PROVIDER:  Toma Diallo APRN    Chief Complaint   Patient presents with    Chest Pain    Edema     Bilateral ankles       HPI: Ziggy Liriano is a 44 y.o. male who presents for left ventricular hypertrophy and hypertension.  He reports increased lower extremity edema after having Covid in 2021 and then had COVID again 2021 and then lower extremity was started.  Also has chest pain and pressure, had nuclear stress test at [place] 2021 it was low risk, echo showed normal EF with mild LVH.  Echo at [facility] 3/2024 EF 55 to 60%, normal LV thickness, false-positive stress test 3/2024, with normal cath 2024.  Overall doing well, has rare sharp chest pains probably GERD.  He is scheduled for bariatric surgery 2024.    Past Medical History:   Diagnosis Date    Anxiety     Asthma     Edema     Lasix prn, no KCl    Former smoker     Heartburn     chronic/episodic, prn TUMS, EGD Dr. Mills     History of prior use of snuff     Hypertension     Joint pain     Lesion of right lung     likely benign, following w/ pulmonary, repeat CT in 1 year    LVH (left ventricular hypertrophy)     d/t HTN, follows w/ Dr. Mendez    Morbid obesity with BMI of 50.0-59.9, adult     Peripheral edema     Sleep apnea     non-compliant w/ device       Past Surgical History:   Procedure Laterality Date    CARDIAC CATHETERIZATION N/A 2024    Procedure: Left Heart Cath;  Surgeon: Maxwell Mendez MD;  Location:  [facility] INVASIVE LOCATION;  Service: Cardiovascular;  Laterality: N/A;    ENDOSCOPY N/A 7/15/2024    Procedure: ESOPHAGOGASTRODUODENOSCOPY WITH BIOPSY;  Surgeon: Elsie Mills MD;  Location:  [facility];  Service: General;  Laterality: N/A;       Social History     Socioeconomic History    Marital status:  Single   Tobacco Use    Smoking status: Former     Types: Cigarettes    Smokeless tobacco: Former     Types: Snuff     Quit date: 2019   Vaping Use    Vaping status: Never Used   Substance and Sexual Activity    Alcohol use: Never    Drug use: Never    Sexual activity: Never       Family History   Problem Relation Age of Onset    Obesity Mother     Hypertension Mother     Obesity Father     Hypertension Father     Sleep apnea Father     Diabetes Sister     Obesity Paternal Grandmother     Heart attack Paternal Grandfather         ROS:   Constitutional no fever,  no weight loss   Skin no rash, no subcutaneous nodules   Otolaryngeal no difficulty swallowing   Cardiovascular See HPI   Pulmonary no cough, no sputum production   Gastrointestinal no constipation, no diarrhea   Genitourinary no dysuria, no hematuria   Hematologic no easy bruisability, no abnormal bleeding   Musculoskeletal no muscle pain   Neurologic no dizziness, no falls         Allergies   Allergen Reactions    Beef (Diagnostic) Unknown - Low Severity     Tested positive    Mustard Unknown - Low Severity     Allergen tested positive    Shellfish-Derived Products Unknown - Low Severity     Allergen tested positive; reports that he has had iv contrast dye and tolerated without reaction         Current Outpatient Medications:     albuterol sulfate  (90 Base) MCG/ACT inhaler, Inhale 2 puffs Every 6 (Six) Hours As Needed., Disp: , Rfl:     busPIRone (BUSPAR) 10 MG tablet, Take 1 tablet by mouth Daily., Disp: , Rfl:     cetirizine (zyrTEC) 10 MG tablet, Take 1 tablet by mouth Daily., Disp: , Rfl:     fenofibrate 160 MG tablet, Take 1 tablet by mouth Daily., Disp: , Rfl:     fluticasone (FLONASE) 50 MCG/ACT nasal spray, 2 sprays by Each Nare route Daily. Shake liquid, Disp: , Rfl:     furosemide (LASIX) 40 MG tablet, Take 1 tablet by mouth Daily., Disp: , Rfl:     hydrOXYzine (ATARAX) 25 MG tablet, Take 1 tablet by mouth Daily As Needed., Disp: , Rfl:  "    levocetirizine (XYZAL) 5 MG tablet, Take 1 tablet by mouth Daily., Disp: , Rfl:     losartan (COZAAR) 100 MG tablet, Take 1 tablet by mouth Daily., Disp: , Rfl:     Vitals:    10/28/24 1130   BP: 128/82   BP Location: Right arm   Patient Position: Sitting   Pulse: 78   SpO2: 97%   Weight: (!) 191 kg (420 lb 9.6 oz)   Height: 182.9 cm (72\")       Body mass index is 57.04 kg/m².    PHYSICAL EXAM:    General Appearance:   well developed  well nourished  HENT:   oropharynx moist  lips not cyanotic  Neck:  thyroid not enlarged  supple  Respiratory:  no respiratory distress  normal breath sounds  no rales  Cardiovascular:  no jugular venous distention  regular rhythm  apical impulse normal  S1 normal, S2 normal  no S3, no S4   no murmur  no rub, no thrill  carotid pulses normal; no bruit  lower extremity edema: 3+ nonpitting    Musculoskeletal:  no clubbing of fingers.   normocephalic, head atraumatic  Skin:   warm, dry  Psychiatric:  judgement and insight appropriate  normal mood and affect    RESULTS:   Procedures    Results for orders placed during the hospital encounter of 02/29/24    Adult Transthoracic Echo Complete W/ Cont if Necessary Per Protocol    Interpretation Summary    Left ventricular systolic function is normal. Calculated left ventricular EF = 58% Left ventricular ejection fraction appears to be 56 - 60%.    Left ventricular diastolic function was normal.        Labs:  Lab Results   Component Value Date    CHOL 168 04/26/2024    TRIG 90 04/26/2024    HDL 43 04/26/2024     (H) 04/26/2024    AST 22 10/15/2024    ALT 30 10/15/2024     Lab Results   Component Value Date    HGBA1C 5.0 11/30/2023     No components found for: \"CREATINININE\"  eGFR Non  Am   Date Value Ref Range Status   11/26/2021 >60 >60 mL/min/1.73m*2 Final     Comment:     eGFR = estimated GFR; eGFR units = mL/min/1.73 sq meters Chronic Kidney Disease is considered if eGFR <60 mL/min/1.73 sq meters Kidney failure is " considered if eGFR is <15 mL/min/1.73 sq meters. eGFR assumes steady state plasma creatinine concentration; not applicable if renal function is rapidly changing or patient is on dialysis.   11/26/2021 >60 >60 mL/min/1.73m*2 Final     Comment:     eGFR = estimated GFR; eGFR units = mL/min/1.73 sq meters Chronic Kidney Disease is considered if eGFR <60 mL/min/1.73 sq meters Kidney failure is considered if eGFR is <15 mL/min/1.73 sq meters. eGFR assumes steady state plasma creatinine concentration; not applicable if renal function is rapidly changing or patient is on dialysis.     eGFR Non  Amer   Date Value Ref Range Status   10/02/2021 75 >60 mL/min/1.73 Final       Most recent PCP note, imaging tests, and labs reviewed.    ASSESSMENT:  Problem List Items Addressed This Visit       Essential hypertension    Hyperlipidemia LDL goal <100    LVH (left ventricular hypertrophy) - Primary    Overview     11/5/21 Echo: EF 65-70%, mild LVH         WILLS (dyspnea on exertion)    Morbid obesity with BMI of 50.0-59.9, adult       PLAN:    1.  Concentric LVH:  Mild on echo 11/2021 at [place]  Likely secondary to uncontrolled hypertension  Repeat echo 3/2/2024 showed normal LV dimensions and normal EF    2.  Nonpitting edema:  Continue Lasix to 60 mg daily, if needed for edema  Low-sodium diet, exercise, weight loss recommended  Continue support stockings  Weight loss strongly recommended    3.  Hypertension:  Well-controlled on current regimen of losartan, continue for now.    4.  Morbid obesity, BMI 53:  Discussed importance of decreased caloric intake specifically carbohydrates.  He may be a good candidate for semaglutide, he will discuss with PCP    5.  Preop cardiovascular assessment:  Low CV risk due to normal echo and normal cath 4/2024  No further cardiac workup necessary at this time.    Return to clinic as needed.    Thank you for the opportunity to share in the care of your patient; please do not hesitate to  call me with any questions.     Mawxell Mendez MD, FACC  Office: [phone] [address] [place] [address]    10/28/24

## 2024-12-10 ENCOUNTER — TELEMEDICINE (OUTPATIENT)
Dept: BARIATRICS/WEIGHT MGMT | Facility: CLINIC | Age: 44
End: 2024-12-10
Payer: COMMERCIAL

## 2024-12-10 VITALS — HEIGHT: 72 IN | WEIGHT: 315 LBS | BODY MASS INDEX: 42.66 KG/M2

## 2024-12-10 NOTE — PROGRESS NOTES
"[facility] BARIATRIC SURGERY  [address]  [address]  [facility] [id number] [phone]      Patient  Name:  Ziggy Liriano  :  1980      Date of Visit: 12/10/2024      Chief Complaint:  weight gain; unable to maintain weight loss.   Evaluate for possible metabolic and bariatric surgery    From Consult:  \"History of Present Illness:  Ziggy Liriano is a 44 y.o. male who presents today for evaluation, education and consultation regarding metabolic and bariatric surgery (MBS).  Since last seen 2024 he has gained almost 13 pounds.  The patient returns for final visit prior to metabolic and bariatric surgery specifically the sleeve gastrectomy.  Original intake evaluation Tricia DESHPANDE PA-C dated 2023 reviewed.  She notes he says he has been overweight his whole life maximum weight 465 pounds and lost 100 pounds 2 years ago with dietary modification eating mostly chicken but maintained it for only 1 year and the current weight was 387 pounds with a BMI of 52 and that he is disabled cannot read or write and lives with his 2 teenage daughters and is hesitant to proceed with surgery because he is her primary caregiver and needs to be around for them but also says he knows that he needs to lose weight due to his cardiac issues left ventricular hypertrophy and hypertension.      The patient has had issues with morbid obesity for years and only temporary success with non-surgical methods of weight loss.  The patient is seeking LSG to help with the morbid obesity related conditions of anxiety, asthma, peripheral edema, former smoker and dipper, heartburn, hypertension, joint pain, right lung lesion, left ventricular hypertrophy secondary to hypertension, obstructive sleep apnea noncompliant with device.    43-year-old disabled gentleman from [facility].  He takes care of 2 teenage children, his older child age 17 is pregnant and due in 6 months.  He lives in an " "apartment and says he can smell marijuana smoke through the walls.  He no longer smokes cigarettes or dips.  He says originally he went to medical weight management but his medical card would not cover the medication.  One of his main complaints is persistent peripheral edema after getting COVID a few years ago.  He denies reflux symptoms per se but has what he describes as chest pain.  Cardiac workup including heart cath was negative.  He is illiterate and I encouraged him to have his children read labels for him so he can avoid corn syrup going forward.  He attended informed consent last night and says he found it helpful and says he has been doing all the wrong things.  He is aware at his BMI sleeve gastrectomy at least theoretically may be a first stage procedure.  He does not know why he is on baby aspirin, says he thinks our office prescribed it, he voiced understanding to avoid aspirin 1 week prior and 6 weeks after sleeve gastrectomy to minimize the risk of delayed leak and says he will comply.  He says he is intolerant of his CPAP, still has a machine and does not use it.  He denies any gallbladder symptoms.  He denies personal or family history of bleeding or clotting disorders but admits he does not really know his family history.\"    -----------------  Update 12/10/24:    No new issues/concerns. Avoiding tobacco/nicotine/steroids/ASA. No longer taking daily ASA. Reminded to hold NSAIDs, ASA x 1 week prior to surgery. Started pre-op diet.       Past Medical History:   Diagnosis Date    Anxiety     Asthma     Edema     Lasix prn, no KCl    Former smoker     Heartburn     chronic/episodic, prn TUMS, EGD Dr. Mills 7/24    History of prior use of snuff     Hypertension     Joint pain     Lesion of right lung     likely benign, following w/ pulmonary, repeat CT in 1 year    LVH (left ventricular hypertrophy)     d/t HTN, follows w/ Dr. Mendez    Morbid obesity with BMI of 50.0-59.9, adult     Peripheral " edema     Sleep apnea     non-compliant w/ device     Past Surgical History:   Procedure Laterality Date    CARDIAC CATHETERIZATION N/A 4/26/2024    Procedure: Left Heart Cath;  Surgeon: Maxwell Mendez MD;  Location:  [facility];  Service: Cardiovascular;  Laterality: N/A;    ENDOSCOPY N/A 7/15/2024    Procedure: ESOPHAGOGASTRODUODENOSCOPY WITH BIOPSY;  Surgeon: Elsie Mills MD;  Location:  [facility];  Service: General;  Laterality: N/A;       Allergies   Allergen Reactions    Beef (Diagnostic) Unknown - Low Severity     Tested positive    Mustard Unknown - Low Severity     Allergen tested positive    Shellfish-Derived Products Unknown - Low Severity     Allergen tested positive; reports that he has had iv contrast dye and tolerated without reaction       Current Outpatient Medications:     albuterol sulfate  (90 Base) MCG/ACT inhaler, Inhale 2 puffs Every 6 (Six) Hours As Needed., Disp: , Rfl:     busPIRone (BUSPAR) 10 MG tablet, Take 1 tablet by mouth Daily., Disp: , Rfl:     cetirizine (zyrTEC) 10 MG tablet, Take 1 tablet by mouth Daily., Disp: , Rfl:     fenofibrate 160 MG tablet, Take 1 tablet by mouth Daily., Disp: , Rfl:     fluticasone (FLONASE) 50 MCG/ACT nasal spray, 2 sprays by Each Nare route Daily. Shake liquid, Disp: , Rfl:     furosemide (LASIX) 40 MG tablet, Take 1 tablet by mouth Daily., Disp: , Rfl:     hydrOXYzine (ATARAX) 25 MG tablet, Take 1 tablet by mouth Daily As Needed., Disp: , Rfl:     levocetirizine (XYZAL) 5 MG tablet, Take 1 tablet by mouth Daily., Disp: , Rfl:     losartan (COZAAR) 100 MG tablet, Take 1 tablet by mouth Daily., Disp: , Rfl:     Social History     Socioeconomic History    Marital status: Single   Tobacco Use    Smoking status: Former     Types: Cigarettes    Smokeless tobacco: Former     Types: Snuff     Quit date: 2019   Vaping Use    Vaping status: Never Used   Substance and Sexual Activity    Alcohol use: Never    Drug use:  Never    Sexual activity: Never     Family History   Problem Relation Age of Onset    Obesity Mother     Hypertension Mother     Obesity Father     Hypertension Father     Sleep apnea Father     Diabetes Sister     Obesity Paternal Grandmother     Heart attack Paternal Grandfather        Review of Systems   Constitutional:  Positive for unexpected weight gain. Negative for chills, diaphoresis, fever and unexpected weight loss.   HENT:  Negative for congestion and facial swelling.    Eyes:  Negative for blurred vision, double vision and discharge.   Respiratory:  Negative for chest tightness, shortness of breath and stridor.    Cardiovascular:  Positive for chest pain and leg swelling. Negative for palpitations.   Gastrointestinal:  Negative for blood in stool.   Endocrine: Negative for polydipsia.   Genitourinary:  Negative for hematuria.   Musculoskeletal:  Positive for arthralgias.   Skin:  Negative for color change.   Allergic/Immunologic: Negative for immunocompromised state.   Neurological:  Negative for confusion.   Psychiatric/Behavioral:  Positive for sleep disturbance. Negative for self-injury.        I have reviewed the ROS and confirm that it's accurate today.    Physical Exam:  Vital Signs:  Weight: (!) 187 kg (412 lb)   Body mass index is 55.88 kg/m².  Temp: (not recorded)   Pulse: (not recorded)   BP: (not recorded)     Physical Exam  Vitals reviewed.   Constitutional:       Appearance: He is well-developed.      Comments: Diaphoretic   HENT:      Head: Normocephalic and atraumatic.      Nose: Nose normal.   Eyes:      Conjunctiva/sclera: Conjunctivae normal.      Pupils: Pupils are equal, round, and reactive to light.   Neck:      Thyroid: No thyromegaly.      Vascular: No carotid bruit.      Trachea: No tracheal deviation.   Cardiovascular:      Rate and Rhythm: Regular rhythm. Tachycardia present.      Heart sounds: Normal heart sounds.   Pulmonary:      Effort: Pulmonary effort is normal. No  "respiratory distress.      Breath sounds: Normal breath sounds.   Abdominal:      General: There is no distension.      Palpations: Abdomen is soft.      Tenderness: There is no abdominal tenderness.   Musculoskeletal:         General: No deformity. Normal range of motion.      Cervical back: Normal range of motion and neck supple.   Skin:     General: Skin is warm and dry.      Findings: No rash.      Comments: Tattoos.  Compression hose lower extremities.  Nonpitting peripheral edema without obvious lymphedema changes.   Neurological:      Mental Status: He is alert and oriented to person, place, and time.      Cranial Nerves: No cranial nerve deficit.      Coordination: Coordination normal.   Psychiatric:         Behavior: Behavior normal.         Thought Content: Thought content normal.         Judgment: Judgment normal.         Patient Active Problem List   Diagnosis    Essential hypertension    Hyperlipidemia LDL goal <100    LVH (left ventricular hypertrophy)    Chest pain    Leg pain    Ground glass opacity Right [place] stable from 2021    WILLS (dyspnea on exertion)    LACY (obstructive sleep apnea) - non-compliant with CPAP    Anxiety    Joint pain    Morbid obesity with BMI of 50.0-59.9, adult    Edema    SOB (shortness of breath)    Abnormal nuclear stress test    Anginal equivalent    Heartburn    Morbid obesity with body mass index (BMI) of 50.0 to 59.9 in adult    Body mass index (BMI) of 50-59.9 in adult       Assessment: From Consult with Dr. Quinones:    \"Ziggy Liriano is a 44 y.o. year old male with medically complicated obesity.    Metabolic and bariatric surgery is deemed medically necessary given the following obesity related comorbidities including anxiety, asthma, peripheral edema, former smoker and dipper, heartburn, hypertension, joint pain, right lung lesion, left ventricular hypertrophy secondary to hypertension, obstructive sleep apnea noncompliant with device with current Weight: (!) 187 " kg (412 lb) and Body mass index is 55.88 kg/m²..    Encounter Diagnosis   Name Primary?    Body mass index (BMI) of 50-59.9 in adult Yes      Patient is aware that surgery is a tool, and that weight loss and improvement in comorbidities is not guaranteed but only seen in the context of appropriate use, follow up and physical activity.    The patient was present for an approximately a 2.5 hour discussion of the purpose of MBS, how MBS is a tool to assist in achieving weight loss goals, the most common complications and how best to avoid them, and the strategies for short and long term weight loss and improvement in comorbidities.  Ample opportunity to discuss questions was available both in group and during the time of individual examination.    I reviewed his Efrain report which is negative.  Labs dated 10/15/2024 unremarkable CMP and CBC except for an MCH of 34 of note hemoglobin 15.2.  EGD Dr. Mills dated 7/15/2024 noting GE junction at 40 cm no hiatal hernia she notes the patient has episodic heartburn associate with fast food and takes Tums as needed.  Pathology of the antrum showed reactive changes negative for H. pylori distal esophageal biopsy showed reactive changes otherwise unremarkable.  Pulmonary clearance dated 1/31/2024 Wendie ROBERTSON noting normal pulmonary function testing with slightly decreased FVC likely secondary to body habitus she noted he was also having quite a bit of pleuritic type pain with deep breaths and not sure he gave his best effort she cleared him at low risk with a 1.6% pulmonary complication rate and a total criteria point count of 15.  Psychosocial evaluation dated 10/7/2024 Cookie SHI PhD good candidate.  Dietitian evaluation dated 11/30/2023 Linette FRAZIER RD noting that protein intake is too low to ensure successful weight loss that intake of processed and simple carbohydrate is high in the form of fast food and he has minimal balance of fruits and vegetables fast food intake is  "moderate he states he will only weekly or biweekly eats fast food and sweet beverages are not a problem.  Labs dated 11/30/2023 negative H. pylori breath test unremarkable CBC except for macrocytic indices of note hemoglobin 16.2 normal hemoglobin A1c 5.0 unremarkable CMP normal lipid panel normal TSH cardiology clearance dated 5/21/2024 Maxwell Mendez MD low risk.  He notes the patient had normal stress test March 2024 and left heart cath on 4/26/2024 showed normal coronaries and echo on 2/29/2024 showed an ejection fraction of 56 to 60%.  Please see scanned records that I have reviewed and signed off on today.  All of this in addition to the patient's unique history and exam has been taken into consideration in determining their appropriate candidacy for MBS.    Complications  of laparoscopic/possible robotic gastric sleeve were discussed. The patient is well aware of the potential complications of surgery that include but not limited to bleeding, infections, deep venous thrombosis, pulmonary embolism, pulmonary complications such as pneumonia, cardiac events, hernias, small bowel obstruction, damage to the spleen or other organs, bowel injury, disfiguring scars, failure to lose weight, need for additional surgery, conversion to an open procedure, and death. Patient is also aware of complications which apply in this particular procedure that can include but are not limited to a \"leak\" at the staple line which in some instances may require conversion to gastric bypass.    The patient is aware if a hiatal hernia is encountered, it likely will be repaired.  R/B/A Rx to hiatal hernia repair were discussed as outlined in our long consent form.  Briefly risks in addition to those for LSG include recurrent hernia, ANUPAMA, dysphagia, esophageal injury, pneumothorax, injury to the vagus nerves, injury to the thoracic duct, aorta or vena cava.    I discussed avoiding all tobacco products, nicotine,  and second hand smoke at " "least 2 weeks pre-operatively and 6 weeks post-operatively to minimize the risk of sleeve leak.  This included discussing the importance of avoiding even secondhand smoke as the risk of leak is increased.  Examples discussed:  Avoid going in a house or riding in a car where someone has previously smoked in the last 2 weeks and for 6 weeks postoperatively.  Avoid living in a house where someone smokes (even if it's in a separate room/patio/attached garage, etc.).   Avoid congregating with a group of people who are smoking even if it's outside.  It is OK to be around wood burning fires and barbecue.  I explained that I do not know if marijuana has a same effects but my overall recommendation is to avoid it for 2 weeks prior in 6 weeks after surgery.     Discussed the risks, benefits and alternative therapies at great length as outlined in our extensive consent forms, consent videos, and educational teaching process under the direction of the center's .    A copy of the patient's signed informed consent is on file.    R/B/A Rx discussed to postop anticoagulation incl but not limited to bleeding, drug reaction, venothromboembolic events, etc. and agreeable to taking post op  Eliquis 2.5 mg po Q 12 hrs #42        Plan:   After evaluation today I think the patient is a reasonable candidate for laparoscopic sleeve gastrectomy and EGD.  Once again given his BMI sleeve gastrectomy at least theoretically may be a first stage procedure as above.  Watch potassium perioperatively on chronic Lasix therapy.      Other issues include:   anxiety, asthma, peripheral edema, former smoker and dipper, heartburn, hypertension, joint pain, right lung lesion, left ventricular hypertrophy secondary to hypertension, obstructive sleep apnea noncompliant with device    Thank you Toma ROBERTSON for the opportunity evaluate Mr. Liriano.\"    As above, will proceed with laparoscopic sleeve gastrectomy @ [facility] w/  " Stacie.      Radha Alvarado, APRN        Note: This was an audio and video enabled telemedicine encounter conducted via [employer].  Patient is located in [place].  Provider is at the office. Consent was obtained prior to the visit.

## 2025-01-08 ENCOUNTER — TELEMEDICINE (OUTPATIENT)
Dept: BARIATRICS/WEIGHT MGMT | Facility: CLINIC | Age: 45
End: 2025-01-08
Payer: COMMERCIAL

## 2025-01-08 VITALS — HEIGHT: 72 IN | BODY MASS INDEX: 42.66 KG/M2 | WEIGHT: 315 LBS

## 2025-01-08 DIAGNOSIS — E66.01 MORBID OBESITY WITH BMI OF 50.0-59.9, ADULT: Primary | ICD-10-CM

## 2025-01-08 PROCEDURE — 1159F MED LIST DOCD IN RCRD: CPT | Performed by: NURSE PRACTITIONER

## 2025-01-08 PROCEDURE — 1160F RVW MEDS BY RX/DR IN RCRD: CPT | Performed by: NURSE PRACTITIONER

## 2025-01-08 PROCEDURE — 99214 OFFICE O/P EST MOD 30 MIN: CPT | Performed by: NURSE PRACTITIONER

## 2025-01-08 NOTE — PROGRESS NOTES
"[facility] BARIATRIC SURGERY  [address]  [address]  [facility] [id number] [phone]      Patient  Name:  Zigyg Liriano  :  1980      Date of Visit: 12/10/2024      Chief Complaint:  weight gain; unable to maintain weight loss.   Evaluate for possible metabolic and bariatric surgery    From Consult:  \"History of Present Illness:  Ziggy Liriano is a 44 y.o. male who presents today for evaluation, education and consultation regarding metabolic and bariatric surgery (MBS).  Since last seen 2024 he has gained almost 13 pounds.  The patient returns for final visit prior to metabolic and bariatric surgery specifically the sleeve gastrectomy.  Original intake evaluation Tricia DESHPANDE PA-C dated 2023 reviewed.  She notes he says he has been overweight his whole life maximum weight 465 pounds and lost 100 pounds 2 years ago with dietary modification eating mostly chicken but maintained it for only 1 year and the current weight was 387 pounds with a BMI of 52 and that he is disabled cannot read or write and lives with his 2 teenage daughters and is hesitant to proceed with surgery because he is her primary caregiver and needs to be around for them but also says he knows that he needs to lose weight due to his cardiac issues left ventricular hypertrophy and hypertension.      The patient has had issues with morbid obesity for years and only temporary success with non-surgical methods of weight loss.  The patient is seeking LSG to help with the morbid obesity related conditions of anxiety, asthma, peripheral edema, former smoker and dipper, heartburn, hypertension, joint pain, right lung lesion, left ventricular hypertrophy secondary to hypertension, obstructive sleep apnea noncompliant with device.    43-year-old disabled gentleman from [facility].  He takes care of 2 teenage children, his older child age 17 is pregnant and due in 6 months.  He lives in an " "apartment and says he can smell marijuana smoke through the walls.  He no longer smokes cigarettes or dips.  He says originally he went to medical weight management but his medical card would not cover the medication.  One of his main complaints is persistent peripheral edema after getting COVID a few years ago.  He denies reflux symptoms per se but has what he describes as chest pain.  Cardiac workup including heart cath was negative.  He is illiterate and I encouraged him to have his children read labels for him so he can avoid corn syrup going forward.  He attended informed consent last night and says he found it helpful and says he has been doing all the wrong things.  He is aware at his BMI sleeve gastrectomy at least theoretically may be a first stage procedure.  He does not know why he is on baby aspirin, says he thinks our office prescribed it, he voiced understanding to avoid aspirin 1 week prior and 6 weeks after sleeve gastrectomy to minimize the risk of delayed leak and says he will comply.  He says he is intolerant of his CPAP, still has a machine and does not use it.  He denies any gallbladder symptoms.  He denies personal or family history of bleeding or clotting disorders but admits he does not really know his family history.\"    -----------------  Update 1/8/25:    No new issues/concerns. Avoiding tobacco/nicotine/steroids/ASA. No longer taking daily ASA. Reminded to hold NSAIDs, ASA x 1 week prior to surgery. Started pre-op diet.       Past Medical History:   Diagnosis Date    Anxiety     Asthma     Edema     Lasix prn, no KCl    Former smoker     Heartburn     chronic/episodic, prn TUMS, EGD Dr. Mills 7/24    History of prior use of snuff     Hypertension     Joint pain     Lesion of right lung     likely benign, following w/ pulmonary, repeat CT in 1 year    LVH (left ventricular hypertrophy)     d/t HTN, follows w/ Dr. Mendez    Morbid obesity with BMI of 50.0-59.9, adult     Peripheral edema "     Sleep apnea     non-compliant w/ device     Past Surgical History:   Procedure Laterality Date    CARDIAC CATHETERIZATION N/A 4/26/2024    Procedure: Left Heart Cath;  Surgeon: Maxwell Mendez MD;  Location:  [facility];  Service: Cardiovascular;  Laterality: N/A;    ENDOSCOPY N/A 7/15/2024    Procedure: ESOPHAGOGASTRODUODENOSCOPY WITH BIOPSY;  Surgeon: Elsie Mills MD;  Location:  [facility];  Service: General;  Laterality: N/A;       Allergies   Allergen Reactions    Beef (Diagnostic) Unknown - Low Severity     Tested positive    Mustard Unknown - Low Severity     Allergen tested positive    Shellfish-Derived Products Unknown - Low Severity     Allergen tested positive; reports that he has had iv contrast dye and tolerated without reaction       Current Outpatient Medications:     albuterol sulfate  (90 Base) MCG/ACT inhaler, Inhale 2 puffs Every 6 (Six) Hours As Needed., Disp: , Rfl:     busPIRone (BUSPAR) 10 MG tablet, Take 1 tablet by mouth Daily., Disp: , Rfl:     cetirizine (zyrTEC) 10 MG tablet, Take 1 tablet by mouth Daily., Disp: , Rfl:     fenofibrate 160 MG tablet, Take 1 tablet by mouth Daily., Disp: , Rfl:     furosemide (LASIX) 40 MG tablet, Take 1 tablet by mouth Daily., Disp: , Rfl:     hydrOXYzine (ATARAX) 25 MG tablet, Take 1 tablet by mouth Daily As Needed., Disp: , Rfl:     levocetirizine (XYZAL) 5 MG tablet, Take 1 tablet by mouth Daily., Disp: , Rfl:     losartan (COZAAR) 100 MG tablet, Take 1 tablet by mouth Daily., Disp: , Rfl:     fluticasone (FLONASE) 50 MCG/ACT nasal spray, 2 sprays by Each Nare route Daily. Shake liquid (Patient not taking: Reported on 1/8/2025), Disp: , Rfl:     Social History     Socioeconomic History    Marital status: Single   Tobacco Use    Smoking status: Former     Types: Cigarettes    Smokeless tobacco: Former     Types: Snuff     Quit date: 2019   Vaping Use    Vaping status: Never Used   Substance and Sexual  Activity    Alcohol use: Never    Drug use: Never    Sexual activity: Never     Family History   Problem Relation Age of Onset    Obesity Mother     Hypertension Mother     Obesity Father     Hypertension Father     Sleep apnea Father     Diabetes Sister     Obesity Paternal Grandmother     Heart attack Paternal Grandfather        Review of Systems   Constitutional:  Positive for unexpected weight gain. Negative for chills, diaphoresis, fever and unexpected weight loss.   HENT:  Negative for congestion and facial swelling.    Eyes:  Negative for blurred vision, double vision and discharge.   Respiratory:  Negative for chest tightness, shortness of breath and stridor.    Cardiovascular:  Positive for chest pain and leg swelling. Negative for palpitations.   Gastrointestinal:  Negative for blood in stool.   Endocrine: Negative for polydipsia.   Genitourinary:  Negative for hematuria.   Musculoskeletal:  Positive for arthralgias.   Skin:  Negative for color change.   Allergic/Immunologic: Negative for immunocompromised state.   Neurological:  Negative for confusion.   Psychiatric/Behavioral:  Positive for sleep disturbance. Negative for self-injury.        I have reviewed the ROS and confirm that it's accurate today.    Physical Exam:  Vital Signs:  Weight: (!) 187 kg (412 lb)   Body mass index is 55.88 kg/m².  Temp: (not recorded)   Pulse: (not recorded)   BP: (not recorded)     Physical Exam  Vitals reviewed.   Constitutional:       Appearance: He is well-developed.      Comments: Diaphoretic   HENT:      Head: Normocephalic and atraumatic.      Nose: Nose normal.   Eyes:      Conjunctiva/sclera: Conjunctivae normal.      Pupils: Pupils are equal, round, and reactive to light.   Neck:      Thyroid: No thyromegaly.      Vascular: No carotid bruit.      Trachea: No tracheal deviation.   Cardiovascular:      Rate and Rhythm: Regular rhythm. Tachycardia present.      Heart sounds: Normal heart sounds.   Pulmonary:       "Effort: Pulmonary effort is normal. No respiratory distress.      Breath sounds: Normal breath sounds.   Abdominal:      General: There is no distension.      Palpations: Abdomen is soft.      Tenderness: There is no abdominal tenderness.   Musculoskeletal:         General: No deformity. Normal range of motion.      Cervical back: Normal range of motion and neck supple.   Skin:     General: Skin is warm and dry.      Findings: No rash.      Comments: Tattoos.  Compression hose lower extremities.  Nonpitting peripheral edema without obvious lymphedema changes.   Neurological:      Mental Status: He is alert and oriented to person, place, and time.      Cranial Nerves: No cranial nerve deficit.      Coordination: Coordination normal.   Psychiatric:         Behavior: Behavior normal.         Thought Content: Thought content normal.         Judgment: Judgment normal.         Patient Active Problem List   Diagnosis    Essential hypertension    Hyperlipidemia LDL goal <100    LVH (left ventricular hypertrophy)    Chest pain    Leg pain    Ground glass opacity Right Parker stable from 2021    WILLS (dyspnea on exertion)    LACY (obstructive sleep apnea) - non-compliant with CPAP    Anxiety    Joint pain    Morbid obesity with BMI of 50.0-59.9, adult    Edema    SOB (shortness of breath)    Abnormal nuclear stress test    Anginal equivalent    Heartburn    Morbid obesity with body mass index (BMI) of 50.0 to 59.9 in adult    Body mass index (BMI) of 50-59.9 in adult       Assessment: From Consult with Dr. Quinones:    \"Ziggy Liriano is a 44 y.o. year old male with medically complicated obesity.    Metabolic and bariatric surgery is deemed medically necessary given the following obesity related comorbidities including anxiety, asthma, peripheral edema, former smoker and dipper, heartburn, hypertension, joint pain, right lung lesion, left ventricular hypertrophy secondary to hypertension, obstructive sleep apnea noncompliant " with device with current Weight: (!) 187 kg (412 lb) and Body mass index is 55.88 kg/m²..    Encounter Diagnosis   Name Primary?    Morbid obesity with BMI of 50.0-59.9, adult Yes        Patient is aware that surgery is a tool, and that weight loss and improvement in comorbidities is not guaranteed but only seen in the context of appropriate use, follow up and physical activity.    The patient was present for an approximately a 2.5 hour discussion of the purpose of MBS, how MBS is a tool to assist in achieving weight loss goals, the most common complications and how best to avoid them, and the strategies for short and long term weight loss and improvement in comorbidities.  Ample opportunity to discuss questions was available both in group and during the time of individual examination.    I reviewed his Efrain report which is negative.  Labs dated 10/15/2024 unremarkable CMP and CBC except for an MCH of 34 of note hemoglobin 15.2.  EGD Dr. Mills dated 7/15/2024 noting GE junction at 40 cm no hiatal hernia she notes the patient has episodic heartburn associate with fast food and takes Tums as needed.  Pathology of the antrum showed reactive changes negative for H. pylori distal esophageal biopsy showed reactive changes otherwise unremarkable.  Pulmonary clearance dated 1/31/2024 Wendie ROBERTSON noting normal pulmonary function testing with slightly decreased FVC likely secondary to body habitus she noted he was also having quite a bit of pleuritic type pain with deep breaths and not sure he gave his best effort she cleared him at low risk with a 1.6% pulmonary complication rate and a total criteria point count of 15.  Psychosocial evaluation dated 10/7/2024 Cookie SHI PhD good candidate.  Dietitian evaluation dated 11/30/2023 Linette FRAZIER RD noting that protein intake is too low to ensure successful weight loss that intake of processed and simple carbohydrate is high in the form of fast food and he has minimal balance of  "fruits and vegetables fast food intake is moderate he states he will only weekly or biweekly eats fast food and sweet beverages are not a problem.  Labs dated 11/30/2023 negative H. pylori breath test unremarkable CBC except for macrocytic indices of note hemoglobin 16.2 normal hemoglobin A1c 5.0 unremarkable CMP normal lipid panel normal TSH cardiology clearance dated 5/21/2024 Maxwell Mendez MD low risk.  He notes the patient had normal stress test March 2024 and left heart cath on 4/26/2024 showed normal coronaries and echo on 2/29/2024 showed an ejection fraction of 56 to 60%.  Please see scanned records that I have reviewed and signed off on today.  All of this in addition to the patient's unique history and exam has been taken into consideration in determining their appropriate candidacy for MBS.    Complications  of laparoscopic/possible robotic gastric sleeve were discussed. The patient is well aware of the potential complications of surgery that include but not limited to bleeding, infections, deep venous thrombosis, pulmonary embolism, pulmonary complications such as pneumonia, cardiac events, hernias, small bowel obstruction, damage to the spleen or other organs, bowel injury, disfiguring scars, failure to lose weight, need for additional surgery, conversion to an open procedure, and death. Patient is also aware of complications which apply in this particular procedure that can include but are not limited to a \"leak\" at the staple line which in some instances may require conversion to gastric bypass.    The patient is aware if a hiatal hernia is encountered, it likely will be repaired.  R/B/A Rx to hiatal hernia repair were discussed as outlined in our long consent form.  Briefly risks in addition to those for LSG include recurrent hernia, ANUPAMA, dysphagia, esophageal injury, pneumothorax, injury to the vagus nerves, injury to the thoracic duct, aorta or vena cava.    I discussed avoiding all tobacco " "products, nicotine,  and second hand smoke at least 2 weeks pre-operatively and 6 weeks post-operatively to minimize the risk of sleeve leak.  This included discussing the importance of avoiding even secondhand smoke as the risk of leak is increased.  Examples discussed:  Avoid going in a house or riding in a car where someone has previously smoked in the last 2 weeks and for 6 weeks postoperatively.  Avoid living in a house where someone smokes (even if it's in a separate room/patio/attached garage, etc.).   Avoid congregating with a group of people who are smoking even if it's outside.  It is OK to be around wood burning fires and barbecue.  I explained that I do not know if marijuana has a same effects but my overall recommendation is to avoid it for 2 weeks prior in 6 weeks after surgery.     Discussed the risks, benefits and alternative therapies at great length as outlined in our extensive consent forms, consent videos, and educational teaching process under the direction of the center's .    A copy of the patient's signed informed consent is on file.    R/B/A Rx discussed to postop anticoagulation incl but not limited to bleeding, drug reaction, venothromboembolic events, etc. and agreeable to taking post op  Eliquis 2.5 mg po Q 12 hrs #42        Plan:   After evaluation today I think the patient is a reasonable candidate for laparoscopic sleeve gastrectomy and EGD.  Once again given his BMI sleeve gastrectomy at least theoretically may be a first stage procedure as above.  Watch potassium perioperatively on chronic Lasix therapy.      Other issues include:   anxiety, asthma, peripheral edema, former smoker and dipper, heartburn, hypertension, joint pain, right lung lesion, left ventricular hypertrophy secondary to hypertension, obstructive sleep apnea noncompliant with device    Thank you Toma ROBERTSON for the opportunity evaluate Mr. Liriano.\"    As above, will proceed with laparoscopic " sleeve gastrectomy @ [facility] w/ Dr. Quinones.      Radha Alvarado, APRN        Note: This was an audio and video enabled telemedicine encounter conducted via [employer].  Patient is located in [place].  Provider is at the office. Consent was obtained prior to the visit.

## 2025-01-15 ENCOUNTER — PRE-ADMISSION TESTING (OUTPATIENT)
Dept: PREADMISSION TESTING | Facility: HOSPITAL | Age: 45
End: 2025-01-15
Payer: COMMERCIAL

## 2025-01-15 DIAGNOSIS — Z01.89 LABORATORY TEST: Primary | ICD-10-CM

## 2025-01-15 LAB
ABO GROUP BLD: NORMAL
DEPRECATED RDW RBC AUTO: 44.8 FL (ref 37–54)
ERYTHROCYTE [DISTWIDTH] IN BLOOD BY AUTOMATED COUNT: 12.7 % (ref 12.3–15.4)
HBA1C MFR BLD: 4.4 % (ref 4.8–5.6)
HCT VFR BLD AUTO: 43.4 % (ref 37.5–51)
HGB BLD-MCNC: 15 G/DL (ref 13–17.7)
MCH RBC QN AUTO: 33.4 PG (ref 26.6–33)
MCHC RBC AUTO-ENTMCNC: 34.6 G/DL (ref 31.5–35.7)
MCV RBC AUTO: 96.7 FL (ref 79–97)
PLATELET # BLD AUTO: 214 10*3/MM3 (ref 140–450)
PMV BLD AUTO: 10.9 FL (ref 6–12)
POTASSIUM SERPL-SCNC: 4.4 MMOL/L (ref 3.5–5.2)
RBC # BLD AUTO: 4.49 10*6/MM3 (ref 4.14–5.8)
RH BLD: NEGATIVE
WBC NRBC COR # BLD AUTO: 6.86 10*3/MM3 (ref 3.4–10.8)

## 2025-01-15 PROCEDURE — 93005 ELECTROCARDIOGRAM TRACING: CPT

## 2025-01-15 PROCEDURE — 36415 COLL VENOUS BLD VENIPUNCTURE: CPT

## 2025-01-15 PROCEDURE — 85027 COMPLETE CBC AUTOMATED: CPT

## 2025-01-15 PROCEDURE — 86901 BLOOD TYPING SEROLOGIC RH(D): CPT

## 2025-01-15 PROCEDURE — 83036 HEMOGLOBIN GLYCOSYLATED A1C: CPT

## 2025-01-15 PROCEDURE — 84132 ASSAY OF SERUM POTASSIUM: CPT

## 2025-01-15 PROCEDURE — 86900 BLOOD TYPING SEROLOGIC ABO: CPT

## 2025-01-15 PROCEDURE — 87081 CULTURE SCREEN ONLY: CPT

## 2025-01-15 NOTE — PAT
Cardiac clearance in epic dated 24    Patient to apply Chlorhexadine wipes  to surgical area (as instructed) the night before procedure and the AM of procedure. Wipes provided.     Patient instructed to drink 20 ounces of Gatorade or Gatorlyte (if diabetic) and it needs to be completed 1 hour (for Main OR patients) or 2 hours (scheduled  section & [facility] patients) before given arrival time for procedure (NO RED Gatorade and NO Gatorade Zero).    Patient verbalized understanding.     Per Anesthesia Request, patient instructed not to take their ACE/ARB medications on the AM of surgery.     Too early to draw type and screen in PAT.  Please obtain blood bank specimen in pre-op on the day of surgery.     Patient viewed general PAT education video as instructed in their preoperative information received from their surgeon.  Patient stated the general PAT education video was viewed in its entirety and survey completed.  Copies of PAT general education handouts (Incentive Spirometry, Meds to Beds Program, Patient Belongings, Pre-op skin preparation instructions, Blood Glucose testing, Visitor policy, Surgery FAQ, Code H) distributed to patient if not printed. Education related to the PAT pass and skin preparation for surgery (if applicable) completed in PAT as a reinforcement to PAT education video. Patient instructed to return PAT pass provided today as well as completed skin preparation sheet (if applicable) on the day of procedure.     Additionally if patient had not viewed video yet but intended to view it at home or in our waiting area, then referred them to the handout with QR code/link provided during PAT visit.  Encouraged patient/family to read PAT general education handouts thoroughly and notify PAT staff with any questions or concerns. Patient verbalized understanding of all information and priority content.

## 2025-01-16 LAB
MRSA SPEC QL CULT: NORMAL
QT INTERVAL: 398 MS
QTC INTERVAL: 438 MS

## 2025-01-20 ENCOUNTER — TELEPHONE (OUTPATIENT)
Dept: BARIATRICS/WEIGHT MGMT | Facility: CLINIC | Age: 45
End: 2025-01-20

## 2025-01-20 NOTE — TELEPHONE ENCOUNTER
Pt called, stated that he has messed up his pre-op diet. Stated that whoever makes his food for him has been using vegetable oil and sugar. He stated that there is likely a lot of other things that he has been eating that he should not be at this time. He stated that he cannot read/write so he really has no idea what he is supposed to be eating either and has just been trusting whoever lives with him to have read the pre-op diet and make the correct things.     He also stated that his daughters boyfriend has been smoking/vaping in his home with him being directly around it. He stated that he know he should not be around it but cannot guarantee he will not be directly exposed. He stated that he had discussed his need to avoid 2nd hand smoke but it was not taken seriously by said person.     He would like to know how to proceed and get rescheduled if needed. Please advise, thanks!

## 2025-01-23 NOTE — TELEPHONE ENCOUNTER
Stacie, MD Clive Velez Lisa Marie, PA    He can reschedule if he can be tobacco/nicotine, secondhand smoke free for the requisite 2 weeks prior and 6 weeks after surgery and can follow all the other perioperative recommendations.  If not, he is welcome to get a copy of his records and seek his surgery at another program.  Thanks!

## 2025-01-29 ENCOUNTER — OFFICE VISIT (OUTPATIENT)
Dept: BARIATRICS/WEIGHT MGMT | Facility: CLINIC | Age: 45
End: 2025-01-29
Payer: COMMERCIAL

## 2025-01-29 ENCOUNTER — DOCUMENTATION (OUTPATIENT)
Dept: BARIATRICS/WEIGHT MGMT | Facility: CLINIC | Age: 45
End: 2025-01-29
Payer: COMMERCIAL

## 2025-01-29 VITALS
WEIGHT: 315 LBS | BODY MASS INDEX: 42.66 KG/M2 | OXYGEN SATURATION: 97 % | SYSTOLIC BLOOD PRESSURE: 126 MMHG | HEART RATE: 94 BPM | TEMPERATURE: 98.2 F | DIASTOLIC BLOOD PRESSURE: 84 MMHG | RESPIRATION RATE: 18 BRPM | HEIGHT: 72 IN

## 2025-01-29 DIAGNOSIS — E66.01 MORBID OBESITY WITH BMI OF 50.0-59.9, ADULT: Primary | ICD-10-CM

## 2025-01-29 PROCEDURE — 3079F DIAST BP 80-89 MM HG: CPT | Performed by: PHYSICIAN ASSISTANT

## 2025-01-29 PROCEDURE — 1159F MED LIST DOCD IN RCRD: CPT | Performed by: PHYSICIAN ASSISTANT

## 2025-01-29 PROCEDURE — 3074F SYST BP LT 130 MM HG: CPT | Performed by: PHYSICIAN ASSISTANT

## 2025-01-29 PROCEDURE — 99214 OFFICE O/P EST MOD 30 MIN: CPT | Performed by: PHYSICIAN ASSISTANT

## 2025-01-29 PROCEDURE — 1160F RVW MEDS BY RX/DR IN RCRD: CPT | Performed by: PHYSICIAN ASSISTANT

## 2025-01-29 NOTE — PROGRESS NOTES
Bariatric Nutrition Consult     Name: Ziggy Liriano   : 1980   AGE: 44 y.o.   MRN: [id number]      Consult Date: 2025     Surgery desired: sleeve    Height: 182.9cm                  Current weight: 415lbs                    BMI: 56.28    Highest weight: 465lbs                           Lowest weight: unknown    Goals: to feel better and lose weight        Past Medical History:   Diagnosis Date    Anxiety     Asthma     Edema     Lasix prn, no KCl    Former smoker     Heartburn     chronic/episodic, prn TUMS, EGD Dr. Mills     History of prior use of snuff     Hyperlipidemia     Hypertension     Joint pain     Lesion of right lung     likely benign, following w/ pulmonary, repeat CT in 1 year    LVH (left ventricular hypertrophy)     d/t HTN, follows w/ Dr. Mendez    Morbid obesity with BMI of 50.0-59.9, adult     Peripheral edema     Sleep apnea     non-compliant w/ device                                 Diet history reveals patient returns for a follow up nutrition consult. Last consult 23  Patient does not recall any information except to avoid high fructose corn syrup. Patient does not know what protein or carbs are. Patient cannot read or write. Patient has 2 daughters. His oldest (17) helped him choose no sugar added ketchup and Singh Bbq sauce after last consult in an attempt to avoid HFCS. He does the grocery shopping and the cooking and uses his air fryer a lot    Diet hx reveals only eats 1 meal a day, no snacks.  Dinner: 2 hamburgers with fries (air fryer) or 2-3 pork chops with broccoli and cheese and carbs or chicken with green beans and carbs. No snacks  He recalls trying a protein drink (protein powder) in a gym many years ago  He could not name a protein drink  He likes apples    Diet is limited in variety      Protein sources: chicken, pork chops, cheese, hamburger. Likes jerky. Is willing to try protein drinks and greek yogurt. Dislikes fish, eggs, steak, pork  tenderloin    Drinks: water, johnnie unsweet tea, occasional diet soda      Main bariatric nutrition principles discussed and explained. Patient needs to focus on 100g protein daily, 100-140g carbohydrates daily, healthy fat intake, 64 oz fluid daily, no carbonation, and try protein drinks/protein powders. Avoid high fructose corn syrup.Emphasized the importance of adherence and understanding of the bariatric diet . Written info and protein drink samples provided to patient. He will ask his daughter to read the info to him.  Long discussion re high protein and low carb based on his current food intake. Pt needs to eat 3 meals a day or 1 protein drink and 2 meals. Pt will discuss all written info with his oldest daughter, make a meal plan and ask her to go shopping with him to purchase recommended foods.  Pt agreed to try this plan for one month to address his overall preference, ability to adhere to a bariatric diet.       Additional nutritional counseling will be available. PA to discuss with Dr Stacie Montesinos RD,LD

## 2025-01-29 NOTE — PROGRESS NOTES
"[facility] BARIATRIC SURGERY  [address]  [address]  [facility] [id number] [phone]      Patient  Name:  Ziggy Liriano  :  1980      Date of Visit:  2025      Chief Complaint:  weight gain; unable to maintain weight loss.        History of Present Illness:  Ziggy Liriano is a 44 y.o. male pursuing MBS w/ Dr. Quinones.     From Consult 10/22/24:  \"The patient returns for final visit prior to metabolic and bariatric surgery specifically the sleeve gastrectomy.  Original intake evaluation Tricia DESHPANDE PA-C dated 2023 reviewed.  She notes he says he has been overweight his whole life maximum weight 465 pounds and lost 100 pounds 2 years ago with dietary modification eating mostly chicken but maintained it for only 1 year and the current weight was 387 pounds with a BMI of 52 and that he is disabled cannot read or write and lives with his 2 teenage daughters and is hesitant to proceed with surgery because he is her primary caregiver and needs to be around for them but also says he knows that he needs to lose weight due to his cardiac issues left ventricular hypertrophy and hypertension.     The patient has had issues with morbid obesity for years and only temporary success with non-surgical methods of weight loss.  The patient is seeking LSG to help with the morbid obesity related conditions of anxiety, asthma, peripheral edema, former smoker and dipper, heartburn, hypertension, joint pain, right lung lesion, left ventricular hypertrophy secondary to hypertension, obstructive sleep apnea noncompliant with device.    43-year-old disabled gentleman from [facility].  He takes care of 2 teenage children, his older child age 17 is pregnant and due in 6 months.  He lives in an apartment and says he can smell marijuana smoke through the walls.  He no longer smokes cigarettes or dips.  He says originally he went to medical weight management but his medical card " "would not cover the medication.  One of his main complaints is persistent peripheral edema after getting COVID a few years ago.  He denies reflux symptoms per se but has what he describes as chest pain.  Cardiac workup including heart cath was negative.  He is illiterate and I encouraged him to have his children read labels for him so he can avoid corn syrup going forward.  He attended informed consent last night and says he found it helpful and says he has been doing all the wrong things.  He is aware at his BMI sleeve gastrectomy at least theoretically may be a first stage procedure.  He does not know why he is on baby aspirin, says he thinks our office prescribed it, he voiced understanding to avoid aspirin 1 week prior and 6 weeks after sleeve gastrectomy to minimize the risk of delayed leak and says he will comply.  He says he is intolerant of his CPAP, still has a machine and does not use it.  He denies any gallbladder symptoms.  He denies personal or family history of bleeding or clotting disorders but admits he does not really know his family history.\"    Update 1/29/25:  Surgery postponed b/c patient called w/ concerns that he had \"messed up\" his preop diet and also admitted to nicotine vape exposure.      Returns today for reevaluation.  Says that he very much wishes to proceed w/ surgery, but admits he has some concerns w/ the dietary instructions.  Feels limited by the fact that he is illiterate, says he is not always sure of what he is eating.        Eats only 1 meal/day, limited food options - pork chop, hamburger, chicken, cheese.  Cooks w/ air fryer.  Does his own grocery shopping.  Has made protein powder shakes in the past when he went to the gym years ago.  Has not recently experimented w/ any premade protein shakes.      Says he plans to commit to a high protein, low carb diet for a month - as his own personal trial, to see if he can do it.     Denies ASA/NSAIDS/steroids - stopped Arnoldo after MD " Consult eval.     Denies tobacco/nicotine use.  Denies 2nd hand tobacco exposure.  Admits daughter's boyfriend vapes in the home.  17 y/o daughter is pregnant and moved out.  Younger daughter lives w/ him but does not offer much support/assistance.     Past Medical History:   Diagnosis Date    Anxiety     Asthma     Edema     Lasix prn, no KCl    Former smoker     Heartburn     chronic/episodic, prn TUMS, EGD Dr. Mills 7/24    History of prior use of snuff     Hyperlipidemia     Hypertension     Joint pain     Lesion of right lung     likely benign, following w/ pulmonary, repeat CT in 1 year    LVH (left ventricular hypertrophy)     d/t HTN, follows w/ Dr. Mendez    Morbid obesity with BMI of 50.0-59.9, adult     Peripheral edema     Sleep apnea     non-compliant w/ device     Past Surgical History:   Procedure Laterality Date    CARDIAC CATHETERIZATION N/A 4/26/2024    Procedure: Left Heart Cath;  Surgeon: Maxwell Mendez MD;  Location:  [facility] CATH INVASIVE LOCATION;  Service: Cardiovascular;  Laterality: N/A;    ENDOSCOPY N/A 7/15/2024    Procedure: ESOPHAGOGASTRODUODENOSCOPY WITH BIOPSY;  Surgeon: Elsie Mills MD;  Location:  [facility] ENDOSCOPY;  Service: General;  Laterality: N/A;       Allergies   Allergen Reactions    Beef (Diagnostic) Unknown - Low Severity     Tested positive    Mustard Unknown - Low Severity     Allergen tested positive    Shellfish-Derived Products Unknown - Low Severity     Allergen tested positive; reports that he has had iv contrast dye and tolerated without reaction       Current Outpatient Medications:     albuterol sulfate  (90 Base) MCG/ACT inhaler, Inhale 2 puffs Every 6 (Six) Hours As Needed for Wheezing., Disp: , Rfl:     cetirizine (zyrTEC) 10 MG tablet, Take 1 tablet by mouth Daily., Disp: , Rfl:     fenofibrate 160 MG tablet, Take 1 tablet by mouth Daily., Disp: , Rfl:     fluticasone (FLONASE) 50 MCG/ACT nasal spray, 2 sprays by Each Nare route As Needed  for Allergies or Rhinitis. Shake liquid, Disp: , Rfl:     furosemide (LASIX) 40 MG tablet, Take 1 tablet by mouth Daily., Disp: , Rfl:     losartan (COZAAR) 100 MG tablet, Take 1 tablet by mouth Daily., Disp: , Rfl:     busPIRone (BUSPAR) 10 MG tablet, Take 1 tablet by mouth Daily. (Patient not taking: Reported on 1/29/2025), Disp: , Rfl:     hydrOXYzine (ATARAX) 25 MG tablet, Take 1 tablet by mouth Daily As Needed for Itching, Allergies or Anxiety. (Patient not taking: Reported on 1/29/2025), Disp: , Rfl:     Social History     Socioeconomic History    Marital status: Single   Tobacco Use    Smoking status: Former     Types: Cigarettes    Smokeless tobacco: Former     Types: Snuff     Quit date: 2019   Vaping Use    Vaping status: Never Used   Substance and Sexual Activity    Alcohol use: Never    Drug use: Never    Sexual activity: Never     Family History   Problem Relation Age of Onset    Obesity Mother     Hypertension Mother     Obesity Father     Hypertension Father     Sleep apnea Father     Diabetes Sister     Obesity Paternal Grandmother     Heart attack Paternal Grandfather      From Consult 10/22/24:   Review of Systems   Constitutional:  Positive for unexpected weight gain. Negative for chills, diaphoresis, fever and unexpected weight loss.   HENT:  Negative for congestion and facial swelling.    Eyes:  Negative for blurred vision, double vision and discharge.   Respiratory:  Negative for chest tightness, shortness of breath and stridor.    Cardiovascular:  Positive for chest pain and leg swelling. Negative for palpitations.   Gastrointestinal:  Negative for blood in stool.   Endocrine: Negative for polydipsia.   Genitourinary:  Negative for hematuria.   Musculoskeletal:  Positive for arthralgias.   Skin:  Negative for color change.   Allergic/Immunologic: Negative for immunocompromised state.   Neurological:  Negative for confusion.   Psychiatric/Behavioral:  Positive for sleep disturbance. Negative  "for self-injury.        Physical Exam:  Vital Signs:  Weight: (!) 188 kg (415 lb)   Body mass index is 56.28 kg/m².  Temp: 98.2 °F (36.8 °C)   Heart Rate: 94   BP: 126/84     Physical Exam  Constitutional:       General: He is not in acute distress.     Appearance: He is well-developed. He is not diaphoretic.   Eyes:      General: No scleral icterus.  Pulmonary:      Effort: Pulmonary effort is normal.   Neurological:      Mental Status: He is alert and oriented to person, place, and time.         Patient Active Problem List   Diagnosis    Essential hypertension    Hyperlipidemia LDL goal <100    LVH (left ventricular hypertrophy)    Chest pain    Leg pain    Ground glass opacity Right Turner stable from 2021    WILLS (dyspnea on exertion)    LACY (obstructive sleep apnea) - non-compliant with CPAP    Anxiety    Joint pain    Morbid obesity with BMI of 50.0-59.9, adult    Edema    SOB (shortness of breath)    Abnormal nuclear stress test    Anginal equivalent    Heartburn    Morbid obesity with body mass index (BMI) of 50.0 to 59.9 in adult    Body mass index (BMI) of 50-59.9 in adult     From Consult 10/22/24 w/ Dr. Quinones:   Assessment: \" Ziggy Liriano is a 44 y.o. male with medically complicated obesity.    Metabolic and bariatric surgery is deemed medically necessary given the following obesity related comorbidities including anxiety, asthma, peripheral edema, former smoker and dipper, heartburn, hypertension, joint pain, right lung lesion, left ventricular hypertrophy secondary to hypertension, obstructive sleep apnea noncompliant with device with current Weight: (!) 188 kg (415 lb) and Body mass index is 56.28 kg/m².    Encounter Diagnosis   Name Primary?    Morbid obesity with BMI of 50.0-59.9, adult Yes      Patient is aware that surgery is a tool, and that weight loss and improvement in comorbidities is not guaranteed but only seen in the context of appropriate use, follow up and physical activity.    The " patient was present for an approximately a 2.5 hour discussion of the purpose of MBS, how MBS is a tool to assist in achieving weight loss goals, the most common complications and how best to avoid them, and the strategies for short and long term weight loss and improvement in comorbidities.  Ample opportunity to discuss questions was available both in group and during the time of individual examination.    I reviewed his Efrain report which is negative.  Labs dated 10/15/2024 unremarkable CMP and CBC except for an MCH of 34 of note hemoglobin 15.2.  EGD Dr. Mills dated 7/15/2024 noting GE junction at 40 cm no hiatal hernia she notes the patient has episodic heartburn associate with fast food and takes Tums as needed.  Pathology of the antrum showed reactive changes negative for H. pylori distal esophageal biopsy showed reactive changes otherwise unremarkable.  Pulmonary clearance dated 1/31/2024 Wendie ROBERTSON noting normal pulmonary function testing with slightly decreased FVC likely secondary to body habitus she noted he was also having quite a bit of pleuritic type pain with deep breaths and not sure he gave his best effort she cleared him at low risk with a 1.6% pulmonary complication rate and a total criteria point count of 15.  Psychosocial evaluation dated 10/7/2024 Cookie SHI PhD good candidate.  Dietitian evaluation dated 11/30/2023 Linette FRAZIER RD noting that protein intake is too low to ensure successful weight loss that intake of processed and simple carbohydrate is high in the form of fast food and he has minimal balance of fruits and vegetables fast food intake is moderate he states he will only weekly or biweekly eats fast food and sweet beverages are not a problem.  Labs dated 11/30/2023 negative H. pylori breath test unremarkable CBC except for macrocytic indices of note hemoglobin 16.2 normal hemoglobin A1c 5.0 unremarkable CMP normal lipid panel normal TSH cardiology clearance dated 5/21/2024 Maxwell  "MD Andrea low risk.  He notes the patient had normal stress test March 2024 and left heart cath on 4/26/2024 showed normal coronaries and echo on 2/29/2024 showed an ejection fraction of 56 to 60%.  Please see scanned records that I have reviewed and signed off on today.  All of this in addition to the patient's unique history and exam has been taken into consideration in determining their appropriate candidacy for MBS.    Complications  of laparoscopic/possible robotic gastric sleeve were discussed. The patient is well aware of the potential complications of surgery that include but not limited to bleeding, infections, deep venous thrombosis, pulmonary embolism, pulmonary complications such as pneumonia, cardiac events, hernias, small bowel obstruction, damage to the spleen or other organs, bowel injury, disfiguring scars, failure to lose weight, need for additional surgery, conversion to an open procedure, and death. Patient is also aware of complications which apply in this particular procedure that can include but are not limited to a \"leak\" at the staple line which in some instances may require conversion to gastric bypass.    The patient is aware if a hiatal hernia is encountered, it likely will be repaired.  R/B/A Rx to hiatal hernia repair were discussed as outlined in our long consent form.  Briefly risks in addition to those for LSG include recurrent hernia, ANUPAMA, dysphagia, esophageal injury, pneumothorax, injury to the vagus nerves, injury to the thoracic duct, aorta or vena cava.    I discussed avoiding all tobacco products, nicotine,  and second hand smoke at least 2 weeks pre-operatively and 6 weeks post-operatively to minimize the risk of sleeve leak.  This included discussing the importance of avoiding even secondhand smoke as the risk of leak is increased.  Examples discussed:  Avoid going in a house or riding in a car where someone has previously smoked in the last 2 weeks and for 6 weeks " "postoperatively.  Avoid living in a house where someone smokes (even if it's in a separate room/patio/attached garage, etc.).   Avoid congregating with a group of people who are smoking even if it's outside.  It is OK to be around wood burning fires and barbecue.  I explained that I do not know if marijuana has a same effects but my overall recommendation is to avoid it for 2 weeks prior in 6 weeks after surgery.     Discussed the risks, benefits and alternative therapies at great length as outlined in our extensive consent forms, consent videos, and educational teaching process under the direction of the center's .    A copy of the patient's signed informed consent is on file.    R/B/A Rx discussed to postop anticoagulation incl but not limited to bleeding, drug reaction, venothromboembolic events, etc. and agreeable to taking post op  Eliquis 2.5 mg po Q 12 hrs #42    Plan:  After evaluation today I think the patient is a reasonable candidate for laparoscopic sleeve gastrectomy and EGD.  Once again given his BMI sleeve gastrectomy at least theoretically may be a first stage procedure as above.  Watch potassium perioperatively on chronic Lasix therapy.      Other issues include:   anxiety, asthma, peripheral edema, former smoker and dipper, heartburn, hypertension, joint pain, right lung lesion, left ventricular hypertrophy secondary to hypertension, obstructive sleep apnea noncompliant with device.\"    ---- Surgery postponed pending further evaluation.  Discussed importance of following perioperative instructions.  Follow up w/ dietitian today to further review necessary dietary commitments.  RTC in 1 month for reevaluation.        ASAEL Osorio      I spent 30 minutes caring for Ziggy on this date of service. This time includes time spent by me in the following activities: preparing for the visit, counseling and educating the patient/family/caregiver, and care coordination          "

## 2025-02-20 ENCOUNTER — TELEPHONE (OUTPATIENT)
Dept: BARIATRICS/WEIGHT MGMT | Facility: CLINIC | Age: 45
End: 2025-02-20
Payer: COMMERCIAL

## 2025-02-20 NOTE — TELEPHONE ENCOUNTER
Pt called García, left VM stating that he would like to know if he is ok proceed with surgery and what the next steps are.    García transferred VM to clinical staff to ask providers how to proceed.     Please advise, thanks!

## 2025-02-21 NOTE — TELEPHONE ENCOUNTER
Contacted pt to let him know that he will needs an OV next week for reevaluation. Pt verbalized understanding. Call connected to  to get scheudled

## 2025-03-03 ENCOUNTER — OFFICE VISIT (OUTPATIENT)
Dept: BARIATRICS/WEIGHT MGMT | Facility: CLINIC | Age: 45
End: 2025-03-03
Payer: COMMERCIAL

## 2025-03-03 VITALS
SYSTOLIC BLOOD PRESSURE: 116 MMHG | WEIGHT: 315 LBS | HEART RATE: 81 BPM | TEMPERATURE: 98.2 F | OXYGEN SATURATION: 97 % | DIASTOLIC BLOOD PRESSURE: 84 MMHG | BODY MASS INDEX: 42.66 KG/M2 | HEIGHT: 72 IN | RESPIRATION RATE: 18 BRPM

## 2025-03-03 DIAGNOSIS — E66.01 MORBID OBESITY WITH BMI OF 50.0-59.9, ADULT: Primary | ICD-10-CM

## 2025-03-03 PROCEDURE — 99213 OFFICE O/P EST LOW 20 MIN: CPT | Performed by: PHYSICIAN ASSISTANT

## 2025-03-03 PROCEDURE — 3079F DIAST BP 80-89 MM HG: CPT | Performed by: PHYSICIAN ASSISTANT

## 2025-03-03 PROCEDURE — 3074F SYST BP LT 130 MM HG: CPT | Performed by: PHYSICIAN ASSISTANT

## 2025-03-03 RX ORDER — IBUPROFEN 800 MG/1
1 TABLET, FILM COATED ORAL EVERY 6 HOURS
COMMUNITY
Start: 2025-02-10

## 2025-03-03 RX ORDER — CEPHALEXIN 500 MG/1
500 CAPSULE ORAL DAILY
COMMUNITY
Start: 2025-02-04

## 2025-03-13 NOTE — PROGRESS NOTES
"[facility] BARIATRIC SURGERY  [address]  [address]  [facility] [id number] [phone]      Patient  Name:  Ziggy Liriano  :  1980      Date of Visit:  2025        Chief Complaint:  weight gain; unable to maintain weight loss.        History of Present Illness:  Ziggy Liriano is a 44 y.o. male pursuing MBS w/ Dr. Quinones.     From Consult 10/22/24:  \"The patient returns for final visit prior to metabolic and bariatric surgery specifically the sleeve gastrectomy.  Original intake evaluation Tricia DESHPANDE PA-C dated 2023 reviewed.  She notes he says he has been overweight his whole life maximum weight 465 pounds and lost 100 pounds 2 years ago with dietary modification eating mostly chicken but maintained it for only 1 year and the current weight was 387 pounds with a BMI of 52 and that he is disabled cannot read or write and lives with his 2 teenage daughters and is hesitant to proceed with surgery because he is her primary caregiver and needs to be around for them but also says he knows that he needs to lose weight due to his cardiac issues left ventricular hypertrophy and hypertension.     The patient has had issues with morbid obesity for years and only temporary success with non-surgical methods of weight loss.  The patient is seeking LSG to help with the morbid obesity related conditions of anxiety, asthma, peripheral edema, former smoker and dipper, heartburn, hypertension, joint pain, right lung lesion, left ventricular hypertrophy secondary to hypertension, obstructive sleep apnea noncompliant with device.    43-year-old disabled gentleman from [facility].  He takes care of 2 teenage children, his older child age 17 is pregnant and due in 6 months.  He lives in an apartment and says he can smell marijuana smoke through the walls.  He no longer smokes cigarettes or dips.  He says originally he went to medical weight management but his medical card " "would not cover the medication.  One of his main complaints is persistent peripheral edema after getting COVID a few years ago.  He denies reflux symptoms per se but has what he describes as chest pain.  Cardiac workup including heart cath was negative.  He is illiterate and I encouraged him to have his children read labels for him so he can avoid corn syrup going forward.  He attended informed consent last night and says he found it helpful and says he has been doing all the wrong things.  He is aware at his BMI sleeve gastrectomy at least theoretically may be a first stage procedure.  He does not know why he is on baby aspirin, says he thinks our office prescribed it, he voiced understanding to avoid aspirin 1 week prior and 6 weeks after sleeve gastrectomy to minimize the risk of delayed leak and says he will comply.  He says he is intolerant of his CPAP, still has a machine and does not use it.  He denies any gallbladder symptoms.  He denies personal or family history of bleeding or clotting disorders but admits he does not really know his family history.\"    Update 1/29/25:  Surgery postponed b/c patient called w/ concerns that he had \"messed up\" his preop diet and also admitted to nicotine vape exposure.      Returns today for reevaluation.  Says that he very much wishes to proceed w/ surgery, but admits he has some concerns w/ the dietary instructions.  Feels limited by the fact that he is illiterate, says he is not always sure of what he is eating.        Eats only 1 meal/day, limited food options - pork chop, hamburger, chicken, cheese.  Cooks w/ air fryer.  Does his own grocery shopping.  Has made protein powder shakes in the past when he went to the gym years ago.  Has not recently experimented w/ any premade protein shakes.      Says he plans to commit to a high protein, low carb diet for a month - as his own personal trial, to see if he can do it.     Denies ASA/NSAIDS/steroids - stopped Arnoldo after MD " Consult eval.     Denies tobacco/nicotine use.  Denies 2nd hand tobacco exposure.  Admits daughter's boyfriend vapes in the home.  19 y/o daughter is pregnant and moved out.  Younger daughter lives w/ him but does not offer much support/assistance.     Update 3/3/25:  Returns for reevaluation.  Has not been able to focus or commit to the dietary changes discussed previously.  Says his mom has been sick and he has been busy caring for her.  Also says he is having dental issues, feels that needs to be resolved prior to proceeding w/ surgery.  Wishes to have a few more weeks to refocus.      Past Medical History:   Diagnosis Date    Anxiety     Asthma     Edema     Lasix prn, no KCl    Former smoker     Heartburn     chronic/episodic, prn TUMS, EGD Dr. Mills 7/24    History of prior use of snuff     Hyperlipidemia     Hypertension     Joint pain     Lesion of right lung     likely benign, following w/ pulmonary, repeat CT in 1 year    LVH (left ventricular hypertrophy)     d/t HTN, follows w/ Dr. Mendez    Morbid obesity with BMI of 50.0-59.9, adult     Peripheral edema     Sleep apnea     non-compliant w/ device     Past Surgical History:   Procedure Laterality Date    CARDIAC CATHETERIZATION N/A 4/26/2024    Procedure: Left Heart Cath;  Surgeon: Maxwell Mendez MD;  Location:  [facility];  Service: Cardiovascular;  Laterality: N/A;    ENDOSCOPY N/A 7/15/2024    Procedure: ESOPHAGOGASTRODUODENOSCOPY WITH BIOPSY;  Surgeon: Elsie Mills MD;  Location:  [facility];  Service: General;  Laterality: N/A;       Allergies   Allergen Reactions    Beef (Diagnostic) Unknown - Low Severity     Tested positive    Mustard Unknown - Low Severity     Allergen tested positive    Shellfish-Derived Products Unknown - Low Severity     Allergen tested positive; reports that he has had iv contrast dye and tolerated without reaction       Current Outpatient Medications:     albuterol sulfate  (90  Base) MCG/ACT inhaler, Inhale 2 puffs Every 6 (Six) Hours As Needed for Wheezing., Disp: , Rfl:     cephalexin (KEFLEX) 500 MG capsule, Take 1 capsule by mouth Daily., Disp: , Rfl:     cetirizine (zyrTEC) 10 MG tablet, Take 1 tablet by mouth Daily., Disp: , Rfl:     fenofibrate 160 MG tablet, Take 1 tablet by mouth Daily., Disp: , Rfl:     fluticasone (FLONASE) 50 MCG/ACT nasal spray, 2 sprays by Each Nare route As Needed for Allergies or Rhinitis. Shake liquid, Disp: , Rfl:     furosemide (LASIX) 40 MG tablet, Take 1 tablet by mouth Daily., Disp: , Rfl:     hydrOXYzine (ATARAX) 25 MG tablet, Take 1 tablet by mouth Daily As Needed for Itching, Allergies or Anxiety., Disp: , Rfl:     ibuprofen (ADVIL,MOTRIN) 800 MG tablet, Take 1 tablet by mouth Every 6 (Six) Hours., Disp: , Rfl:     losartan (COZAAR) 100 MG tablet, Take 1 tablet by mouth Daily., Disp: , Rfl:     busPIRone (BUSPAR) 10 MG tablet, Take 1 tablet by mouth Daily. (Patient not taking: Reported on 1/29/2025), Disp: , Rfl:     Social History     Socioeconomic History    Marital status: Single   Tobacco Use    Smoking status: Former     Types: Cigarettes    Smokeless tobacco: Former     Types: Snuff     Quit date: 2019   Vaping Use    Vaping status: Never Used   Substance and Sexual Activity    Alcohol use: Never    Drug use: Never    Sexual activity: Never     Family History   Problem Relation Age of Onset    Obesity Mother     Hypertension Mother     Obesity Father     Hypertension Father     Sleep apnea Father     Diabetes Sister     Obesity Paternal Grandmother     Heart attack Paternal Grandfather      From Consult 10/22/24:   Review of Systems   Constitutional:  Positive for unexpected weight gain. Negative for chills, diaphoresis, fever and unexpected weight loss.   HENT:  Negative for congestion and facial swelling.    Eyes:  Negative for blurred vision, double vision and discharge.   Respiratory:  Negative for chest tightness, shortness of breath and  "stridor.    Cardiovascular:  Positive for chest pain and leg swelling. Negative for palpitations.   Gastrointestinal:  Negative for blood in stool.   Endocrine: Negative for polydipsia.   Genitourinary:  Negative for hematuria.   Musculoskeletal:  Positive for arthralgias.   Skin:  Negative for color change.   Allergic/Immunologic: Negative for immunocompromised state.   Neurological:  Negative for confusion.   Psychiatric/Behavioral:  Positive for sleep disturbance. Negative for self-injury.        Physical Exam:  Vital Signs:  Weight: (!) 189 kg (416 lb)   Body mass index is 56.42 kg/m².  Temp: 98.2 °F (36.8 °C)   Heart Rate: 81   BP: 116/84     Physical Exam  Constitutional:       General: He is not in acute distress.     Appearance: He is well-developed. He is not diaphoretic.   Eyes:      General: No scleral icterus.  Cardiovascular:      Rate and Rhythm: Normal rate.   Pulmonary:      Effort: Pulmonary effort is normal.   Musculoskeletal:         General: Normal range of motion.   Neurological:      Mental Status: He is alert and oriented to person, place, and time.         Patient Active Problem List   Diagnosis    Essential hypertension    Hyperlipidemia LDL goal <100    LVH (left ventricular hypertrophy)    Chest pain    Leg pain    Ground glass opacity Right Herculaneum stable from 2021    WILLS (dyspnea on exertion)    LACY (obstructive sleep apnea) - non-compliant with CPAP    Anxiety    Joint pain    Morbid obesity with BMI of 50.0-59.9, adult    Edema    SOB (shortness of breath)    Abnormal nuclear stress test    Anginal equivalent    Heartburn    Morbid obesity with body mass index (BMI) of 50.0 to 59.9 in adult    Body mass index (BMI) of 50-59.9 in adult     From Consult 10/22/24 w/ Dr. Quinones:   Assessment: \" Ziggy Liriano is a 44 y.o. male with medically complicated obesity.    Metabolic and bariatric surgery is deemed medically necessary given the following obesity related comorbidities including " anxiety, asthma, peripheral edema, former smoker and dipper, heartburn, hypertension, joint pain, right lung lesion, left ventricular hypertrophy secondary to hypertension, obstructive sleep apnea noncompliant with device with current Weight: (!) 189 kg (416 lb) and Body mass index is 56.42 kg/m².    Encounter Diagnosis   Name Primary?    Morbid obesity with BMI of 50.0-59.9, adult Yes      Patient is aware that surgery is a tool, and that weight loss and improvement in comorbidities is not guaranteed but only seen in the context of appropriate use, follow up and physical activity.    The patient was present for an approximately a 2.5 hour discussion of the purpose of MBS, how MBS is a tool to assist in achieving weight loss goals, the most common complications and how best to avoid them, and the strategies for short and long term weight loss and improvement in comorbidities.  Ample opportunity to discuss questions was available both in group and during the time of individual examination.    I reviewed his Efrain report which is negative.  Labs dated 10/15/2024 unremarkable CMP and CBC except for an MCH of 34 of note hemoglobin 15.2.  EGD Dr. Mills dated 7/15/2024 noting GE junction at 40 cm no hiatal hernia she notes the patient has episodic heartburn associate with fast food and takes Tums as needed.  Pathology of the antrum showed reactive changes negative for H. pylori distal esophageal biopsy showed reactive changes otherwise unremarkable.  Pulmonary clearance dated 1/31/2024 Wendie ROBERTSON noting normal pulmonary function testing with slightly decreased FVC likely secondary to body habitus she noted he was also having quite a bit of pleuritic type pain with deep breaths and not sure he gave his best effort she cleared him at low risk with a 1.6% pulmonary complication rate and a total criteria point count of 15.  Psychosocial evaluation dated 10/7/2024 Cookie SHI PhD good candidate.  Dietitian evaluation dated  "11/30/2023 Linette FREDDIE MCCORMICK noting that protein intake is too low to ensure successful weight loss that intake of processed and simple carbohydrate is high in the form of fast food and he has minimal balance of fruits and vegetables fast food intake is moderate he states he will only weekly or biweekly eats fast food and sweet beverages are not a problem.  Labs dated 11/30/2023 negative H. pylori breath test unremarkable CBC except for macrocytic indices of note hemoglobin 16.2 normal hemoglobin A1c 5.0 unremarkable CMP normal lipid panel normal TSH cardiology clearance dated 5/21/2024 Maxwell Mendez MD low risk.  He notes the patient had normal stress test March 2024 and left heart cath on 4/26/2024 showed normal coronaries and echo on 2/29/2024 showed an ejection fraction of 56 to 60%.  Please see scanned records that I have reviewed and signed off on today.  All of this in addition to the patient's unique history and exam has been taken into consideration in determining their appropriate candidacy for MBS.    Complications  of laparoscopic/possible robotic gastric sleeve were discussed. The patient is well aware of the potential complications of surgery that include but not limited to bleeding, infections, deep venous thrombosis, pulmonary embolism, pulmonary complications such as pneumonia, cardiac events, hernias, small bowel obstruction, damage to the spleen or other organs, bowel injury, disfiguring scars, failure to lose weight, need for additional surgery, conversion to an open procedure, and death. Patient is also aware of complications which apply in this particular procedure that can include but are not limited to a \"leak\" at the staple line which in some instances may require conversion to gastric bypass.    The patient is aware if a hiatal hernia is encountered, it likely will be repaired.  R/B/A Rx to hiatal hernia repair were discussed as outlined in our long consent form.  Briefly risks in addition to " those for LSG include recurrent hernia, ANUPAMA, dysphagia, esophageal injury, pneumothorax, injury to the vagus nerves, injury to the thoracic duct, aorta or vena cava.    I discussed avoiding all tobacco products, nicotine,  and second hand smoke at least 2 weeks pre-operatively and 6 weeks post-operatively to minimize the risk of sleeve leak.  This included discussing the importance of avoiding even secondhand smoke as the risk of leak is increased.  Examples discussed:  Avoid going in a house or riding in a car where someone has previously smoked in the last 2 weeks and for 6 weeks postoperatively.  Avoid living in a house where someone smokes (even if it's in a separate room/patio/attached garage, etc.).   Avoid congregating with a group of people who are smoking even if it's outside.  It is OK to be around wood burning fires and barbecue.  I explained that I do not know if marijuana has a same effects but my overall recommendation is to avoid it for 2 weeks prior in 6 weeks after surgery.     Discussed the risks, benefits and alternative therapies at great length as outlined in our extensive consent forms, consent videos, and educational teaching process under the direction of the center's .    A copy of the patient's signed informed consent is on file.    R/B/A Rx discussed to postop anticoagulation incl but not limited to bleeding, drug reaction, venothromboembolic events, etc. and agreeable to taking post op  Eliquis 2.5 mg po Q 12 hrs #42    Plan:  After evaluation today I think the patient is a reasonable candidate for laparoscopic sleeve gastrectomy and EGD.  Once again given his BMI sleeve gastrectomy at least theoretically may be a first stage procedure as above.  Watch potassium perioperatively on chronic Lasix therapy.      Other issues include:   anxiety, asthma, peripheral edema, former smoker and dipper, heartburn, hypertension, joint pain, right lung lesion, left ventricular  "hypertrophy secondary to hypertension, obstructive sleep apnea noncompliant with device.\"    ---- Surgery postponed pending further evaluation.  Discussed importance of following perioperative instructions.  Reviewed necessary dietary modifications/commitments.  RTC in 1 month for reevaluation.        Tricia Duffy PA          "

## 2025-05-01 ENCOUNTER — OFFICE VISIT (OUTPATIENT)
Dept: BARIATRICS/WEIGHT MGMT | Facility: CLINIC | Age: 45
End: 2025-05-01
Payer: COMMERCIAL

## 2025-05-01 VITALS
TEMPERATURE: 97.1 F | WEIGHT: 315 LBS | RESPIRATION RATE: 18 BRPM | DIASTOLIC BLOOD PRESSURE: 86 MMHG | HEART RATE: 81 BPM | SYSTOLIC BLOOD PRESSURE: 128 MMHG | OXYGEN SATURATION: 97 % | BODY MASS INDEX: 42.66 KG/M2 | HEIGHT: 72 IN

## 2025-05-01 DIAGNOSIS — E66.01 MORBID OBESITY WITH BMI OF 50.0-59.9, ADULT: Primary | ICD-10-CM

## 2025-05-01 PROCEDURE — 1159F MED LIST DOCD IN RCRD: CPT | Performed by: NURSE PRACTITIONER

## 2025-05-01 PROCEDURE — 3079F DIAST BP 80-89 MM HG: CPT | Performed by: NURSE PRACTITIONER

## 2025-05-01 PROCEDURE — 3074F SYST BP LT 130 MM HG: CPT | Performed by: NURSE PRACTITIONER

## 2025-05-01 PROCEDURE — 1160F RVW MEDS BY RX/DR IN RCRD: CPT | Performed by: NURSE PRACTITIONER

## 2025-05-01 PROCEDURE — 99213 OFFICE O/P EST LOW 20 MIN: CPT | Performed by: NURSE PRACTITIONER

## 2025-05-01 NOTE — PROGRESS NOTES
"[facility] BARIATRIC SURGERY  [address]  [address]  [facility] [id number] [phone]      Patient  Name:  Ziggy Liriano  :  1980      Date of Visit:  2025      Chief Complaint:  weight gain; unable to maintain weight loss.        History of Present Illness:  Ziggy Liriano is a 44 y.o. male pursuing MBS w/ Dr. Quinones.     From Consult 10/22/24:  \"The patient returns for final visit prior to metabolic and bariatric surgery specifically the sleeve gastrectomy.  Original intake evaluation Tricia DESHPANDE PA-C dated 2023 reviewed.  She notes he says he has been overweight his whole life maximum weight 465 pounds and lost 100 pounds 2 years ago with dietary modification eating mostly chicken but maintained it for only 1 year and the current weight was 387 pounds with a BMI of 52 and that he is disabled cannot read or write and lives with his 2 teenage daughters and is hesitant to proceed with surgery because he is her primary caregiver and needs to be around for them but also says he knows that he needs to lose weight due to his cardiac issues left ventricular hypertrophy and hypertension.     The patient has had issues with morbid obesity for years and only temporary success with non-surgical methods of weight loss.  The patient is seeking LSG to help with the morbid obesity related conditions of anxiety, asthma, peripheral edema, former smoker and dipper, heartburn, hypertension, joint pain, right lung lesion, left ventricular hypertrophy secondary to hypertension, obstructive sleep apnea noncompliant with device.    43-year-old disabled gentleman from [facility].  He takes care of 2 teenage children, his older child age 17 is pregnant and due in 6 months.  He lives in an apartment and says he can smell marijuana smoke through the walls.  He no longer smokes cigarettes or dips.  He says originally he went to medical weight management but his medical card " "would not cover the medication.  One of his main complaints is persistent peripheral edema after getting COVID a few years ago.  He denies reflux symptoms per se but has what he describes as chest pain.  Cardiac workup including heart cath was negative.  He is illiterate and I encouraged him to have his children read labels for him so he can avoid corn syrup going forward.  He attended informed consent last night and says he found it helpful and says he has been doing all the wrong things.  He is aware at his BMI sleeve gastrectomy at least theoretically may be a first stage procedure.  He does not know why he is on baby aspirin, says he thinks our office prescribed it, he voiced understanding to avoid aspirin 1 week prior and 6 weeks after sleeve gastrectomy to minimize the risk of delayed leak and says he will comply.  He says he is intolerant of his CPAP, still has a machine and does not use it.  He denies any gallbladder symptoms.  He denies personal or family history of bleeding or clotting disorders but admits he does not really know his family history.\"    Update 1/29/25:  Surgery postponed b/c patient called w/ concerns that he had \"messed up\" his preop diet and also admitted to nicotine vape exposure.      Returns today for reevaluation.  Says that he very much wishes to proceed w/ surgery, but admits he has some concerns w/ the dietary instructions.  Feels limited by the fact that he is illiterate, says he is not always sure of what he is eating.        Eats only 1 meal/day, limited food options - pork chop, hamburger, chicken, cheese.  Cooks w/ air fryer.  Does his own grocery shopping.  Has made protein powder shakes in the past when he went to the gym years ago.  Has not recently experimented w/ any premade protein shakes.      Says he plans to commit to a high protein, low carb diet for a month - as his own personal trial, to see if he can do it.     Denies ASA/NSAIDS/steroids - stopped Arnoldo after MD " "Consult eval.     Denies tobacco/nicotine use.  Denies 2nd hand tobacco exposure.  Admits daughter's boyfriend vapes in the home.  19 y/o daughter is pregnant and moved out.  Younger daughter lives w/ him but does not offer much support/assistance.     Update 3/3/25:  Returns for reevaluation.  Has not been able to focus or commit to the dietary changes discussed previously.  Says his mom has been sick and he has been busy caring for her.  Also says he is having dental issues, feels that needs to be resolved prior to proceeding w/ surgery.  Wishes to have a few more weeks to refocus.\"    -    Update 05/01/25: Returns for reevaluation. Has been avoiding nicotine exposure- daughter and her boyfriend moved out 2 months ago. Has been on the pre-op high protein, low carb diet. Focusing on lots of meat, vegetables. Wishes to have 1 more month, until new grandson gets out of the hospital.\"     Past Medical History:   Diagnosis Date    Anxiety     Asthma     Edema     Lasix prn, no KCl    Former smoker     Heartburn     chronic/episodic, prn TUMS, EGD Dr. Mills 7/24    History of prior use of snuff     Hyperlipidemia     Hypertension     Joint pain     Lesion of right lung     likely benign, following w/ pulmonary, repeat CT in 1 year    LVH (left ventricular hypertrophy)     d/t HTN, follows w/ Dr. Mendez    Morbid obesity with BMI of 50.0-59.9, adult     Peripheral edema     Sleep apnea     non-compliant w/ device     Past Surgical History:   Procedure Laterality Date    CARDIAC CATHETERIZATION N/A 4/26/2024    Procedure: Left Heart Cath;  Surgeon: Maxwell Mendez MD;  Location:  [facility] CATH INVASIVE LOCATION;  Service: Cardiovascular;  Laterality: N/A;    ENDOSCOPY N/A 7/15/2024    Procedure: ESOPHAGOGASTRODUODENOSCOPY WITH BIOPSY;  Surgeon: Elsie Mills MD;  Location:  [facility];  Service: General;  Laterality: N/A;       Allergies   Allergen Reactions    Beef (Diagnostic) Unknown - Low Severity     " Tested positive    Mustard Unknown - Low Severity     Allergen tested positive    Shellfish-Derived Products Unknown - Low Severity     Allergen tested positive; reports that he has had iv contrast dye and tolerated without reaction       Current Outpatient Medications:     albuterol sulfate  (90 Base) MCG/ACT inhaler, Inhale 2 puffs Every 6 (Six) Hours As Needed for Wheezing., Disp: , Rfl:     busPIRone (BUSPAR) 10 MG tablet, Take 1 tablet by mouth Daily., Disp: , Rfl:     cetirizine (zyrTEC) 10 MG tablet, Take 1 tablet by mouth Daily., Disp: , Rfl:     fenofibrate 160 MG tablet, Take 1 tablet by mouth Daily., Disp: , Rfl:     fluticasone (FLONASE) 50 MCG/ACT nasal spray, 2 sprays by Each Nare route As Needed for Allergies or Rhinitis. Shake liquid, Disp: , Rfl:     furosemide (LASIX) 40 MG tablet, Take 1 tablet by mouth Daily., Disp: , Rfl:     losartan (COZAAR) 100 MG tablet, Take 1 tablet by mouth Daily., Disp: , Rfl:     cephalexin (KEFLEX) 500 MG capsule, Take 1 capsule by mouth Daily. (Patient not taking: Reported on 5/1/2025), Disp: , Rfl:     hydrOXYzine (ATARAX) 25 MG tablet, Take 1 tablet by mouth Daily As Needed for Itching, Allergies or Anxiety. (Patient not taking: Reported on 5/1/2025), Disp: , Rfl:     ibuprofen (ADVIL,MOTRIN) 800 MG tablet, Take 1 tablet by mouth Every 6 (Six) Hours. (Patient not taking: Reported on 5/1/2025), Disp: , Rfl:     Social History     Socioeconomic History    Marital status: Single   Tobacco Use    Smoking status: Former     Types: Cigarettes    Smokeless tobacco: Former     Types: Snuff     Quit date: 2019   Vaping Use    Vaping status: Never Used   Substance and Sexual Activity    Alcohol use: Never    Drug use: Never    Sexual activity: Never     Family History   Problem Relation Age of Onset    Obesity Mother     Hypertension Mother     Obesity Father     Hypertension Father     Sleep apnea Father     Diabetes Sister     Obesity Paternal Grandmother     Heart  attack Paternal Grandfather      From Consult 10/22/24:   Review of Systems   Constitutional:  Positive for unexpected weight gain. Negative for chills, diaphoresis, fever and unexpected weight loss.   HENT:  Negative for congestion and facial swelling.    Eyes:  Negative for blurred vision, double vision and discharge.   Respiratory:  Negative for chest tightness, shortness of breath and stridor.    Cardiovascular:  Positive for chest pain and leg swelling. Negative for palpitations.   Gastrointestinal:  Negative for blood in stool.   Endocrine: Negative for polydipsia.   Genitourinary:  Negative for hematuria.   Musculoskeletal:  Positive for arthralgias.   Skin:  Negative for color change.   Allergic/Immunologic: Negative for immunocompromised state.   Neurological:  Negative for confusion.   Psychiatric/Behavioral:  Positive for sleep disturbance. Negative for self-injury.        Physical Exam:  Vital Signs:  Weight: (!) 187 kg (413 lb 3.2 oz)   Body mass index is 56.04 kg/m².  Temp: 97.1 °F (36.2 °C)   Heart Rate: 81   BP: 128/86     Physical Exam  Constitutional:       General: He is not in acute distress.     Appearance: He is well-developed. He is not diaphoretic.   Eyes:      General: No scleral icterus.  Cardiovascular:      Rate and Rhythm: Normal rate.   Pulmonary:      Effort: Pulmonary effort is normal.   Musculoskeletal:         General: Normal range of motion.   Neurological:      Mental Status: He is alert and oriented to person, place, and time.       Patient Active Problem List   Diagnosis    Essential hypertension    Hyperlipidemia LDL goal <100    LVH (left ventricular hypertrophy)    Chest pain    Leg pain    Ground glass opacity Right [place] stable from 2021    WILLS (dyspnea on exertion)    LACY (obstructive sleep apnea) - non-compliant with CPAP    Anxiety    Joint pain    Morbid obesity with BMI of 50.0-59.9, adult    Edema    SOB (shortness of breath)    Abnormal nuclear stress test    Anginal  "equivalent    Heartburn    Morbid obesity with body mass index (BMI) of 50.0 to 59.9 in adult    Body mass index (BMI) of 50-59.9 in adult     From Consult 10/22/24 w/ Dr. Quinones:   Assessment: \" Ziggy Liriano is a 44 y.o. male with medically complicated obesity.    Metabolic and bariatric surgery is deemed medically necessary given the following obesity related comorbidities including anxiety, asthma, peripheral edema, former smoker and dipper, heartburn, hypertension, joint pain, right lung lesion, left ventricular hypertrophy secondary to hypertension, obstructive sleep apnea noncompliant with device with current Weight: (!) 187 kg (413 lb 3.2 oz) and Body mass index is 56.04 kg/m².    No diagnosis found.     Patient is aware that surgery is a tool, and that weight loss and improvement in comorbidities is not guaranteed but only seen in the context of appropriate use, follow up and physical activity.    The patient was present for an approximately a 2.5 hour discussion of the purpose of MBS, how MBS is a tool to assist in achieving weight loss goals, the most common complications and how best to avoid them, and the strategies for short and long term weight loss and improvement in comorbidities.  Ample opportunity to discuss questions was available both in group and during the time of individual examination.    I reviewed his Efrain report which is negative.  Labs dated 10/15/2024 unremarkable CMP and CBC except for an MCH of 34 of note hemoglobin 15.2.  EGD Dr. Mills dated 7/15/2024 noting GE junction at 40 cm no hiatal hernia she notes the patient has episodic heartburn associate with fast food and takes Tums as needed.  Pathology of the antrum showed reactive changes negative for H. pylori distal esophageal biopsy showed reactive changes otherwise unremarkable.  Pulmonary clearance dated 1/31/2024 Wendie ROBERTSON noting normal pulmonary function testing with slightly decreased FVC likely secondary to body " habitus she noted he was also having quite a bit of pleuritic type pain with deep breaths and not sure he gave his best effort she cleared him at low risk with a 1.6% pulmonary complication rate and a total criteria point count of 15.  Psychosocial evaluation dated 10/7/2024 Cookie SHI PhD good candidate.  Dietitian evaluation dated 11/30/2023 Linette FRAZIER RD noting that protein intake is too low to ensure successful weight loss that intake of processed and simple carbohydrate is high in the form of fast food and he has minimal balance of fruits and vegetables fast food intake is moderate he states he will only weekly or biweekly eats fast food and sweet beverages are not a problem.  Labs dated 11/30/2023 negative H. pylori breath test unremarkable CBC except for macrocytic indices of note hemoglobin 16.2 normal hemoglobin A1c 5.0 unremarkable CMP normal lipid panel normal TSH cardiology clearance dated 5/21/2024 Maxwell Mendez MD low risk.  He notes the patient had normal stress test March 2024 and left heart cath on 4/26/2024 showed normal coronaries and echo on 2/29/2024 showed an ejection fraction of 56 to 60%.  Please see scanned records that I have reviewed and signed off on today.  All of this in addition to the patient's unique history and exam has been taken into consideration in determining their appropriate candidacy for MBS.    Complications  of laparoscopic/possible robotic gastric sleeve were discussed. The patient is well aware of the potential complications of surgery that include but not limited to bleeding, infections, deep venous thrombosis, pulmonary embolism, pulmonary complications such as pneumonia, cardiac events, hernias, small bowel obstruction, damage to the spleen or other organs, bowel injury, disfiguring scars, failure to lose weight, need for additional surgery, conversion to an open procedure, and death. Patient is also aware of complications which apply in this particular procedure that  "can include but are not limited to a \"leak\" at the staple line which in some instances may require conversion to gastric bypass.    The patient is aware if a hiatal hernia is encountered, it likely will be repaired.  R/B/A Rx to hiatal hernia repair were discussed as outlined in our long consent form.  Briefly risks in addition to those for LSG include recurrent hernia, ANUPAMA, dysphagia, esophageal injury, pneumothorax, injury to the vagus nerves, injury to the thoracic duct, aorta or vena cava.    I discussed avoiding all tobacco products, nicotine,  and second hand smoke at least 2 weeks pre-operatively and 6 weeks post-operatively to minimize the risk of sleeve leak.  This included discussing the importance of avoiding even secondhand smoke as the risk of leak is increased.  Examples discussed:  Avoid going in a house or riding in a car where someone has previously smoked in the last 2 weeks and for 6 weeks postoperatively.  Avoid living in a house where someone smokes (even if it's in a separate room/patio/attached garage, etc.).   Avoid congregating with a group of people who are smoking even if it's outside.  It is OK to be around wood burning fires and barbecue.  I explained that I do not know if marijuana has a same effects but my overall recommendation is to avoid it for 2 weeks prior in 6 weeks after surgery.     Discussed the risks, benefits and alternative therapies at great length as outlined in our extensive consent forms, consent videos, and educational teaching process under the direction of the center's .    A copy of the patient's signed informed consent is on file.    R/B/A Rx discussed to postop anticoagulation incl but not limited to bleeding, drug reaction, venothromboembolic events, etc. and agreeable to taking post op  Eliquis 2.5 mg po Q 12 hrs #42    Plan:  After evaluation today I think the patient is a reasonable candidate for laparoscopic sleeve gastrectomy and EGD.  " "Once again given his BMI sleeve gastrectomy at least theoretically may be a first stage procedure as above.  Watch potassium perioperatively on chronic Lasix therapy.      Other issues include:   anxiety, asthma, peripheral edema, former smoker and dipper, heartburn, hypertension, joint pain, right lung lesion, left ventricular hypertrophy secondary to hypertension, obstructive sleep apnea noncompliant with device.\"    ---- Surgery postponed pending further evaluation.  Discussed importance of following perioperative instructions.  Reviewed necessary dietary modifications/commitments.  RTC in 1 month for reevaluation.        Radha Alvarado, APRN    "

## 2025-06-03 ENCOUNTER — OFFICE VISIT (OUTPATIENT)
Dept: BARIATRICS/WEIGHT MGMT | Facility: CLINIC | Age: 45
End: 2025-06-03
Payer: COMMERCIAL

## 2025-06-03 VITALS
HEART RATE: 84 BPM | SYSTOLIC BLOOD PRESSURE: 120 MMHG | DIASTOLIC BLOOD PRESSURE: 68 MMHG | WEIGHT: 315 LBS | OXYGEN SATURATION: 98 % | TEMPERATURE: 97.8 F | HEIGHT: 72 IN | BODY MASS INDEX: 42.66 KG/M2 | RESPIRATION RATE: 18 BRPM

## 2025-06-03 DIAGNOSIS — E66.01 MORBID OBESITY WITH BMI OF 50.0-59.9, ADULT: Primary | ICD-10-CM

## 2025-06-03 DIAGNOSIS — Z71.3 ENCOUNTER FOR WEIGHT LOSS COUNSELING: ICD-10-CM

## 2025-06-03 PROCEDURE — 3078F DIAST BP <80 MM HG: CPT

## 2025-06-03 PROCEDURE — 1159F MED LIST DOCD IN RCRD: CPT

## 2025-06-03 PROCEDURE — 3074F SYST BP LT 130 MM HG: CPT

## 2025-06-03 PROCEDURE — 1160F RVW MEDS BY RX/DR IN RCRD: CPT

## 2025-06-03 PROCEDURE — 99214 OFFICE O/P EST MOD 30 MIN: CPT

## 2025-06-03 NOTE — PROGRESS NOTES
"[facility] BARIATRIC SURGERY  [address]  [address]  [facility] [id number] [phone]      Patient  Name:  Ziggy Liriano  :  1980      Date of Visit:  2025      Chief Complaint:  weight gain; unable to maintain weight loss.        History of Present Illness:  Ziggy Liriano is a 44 y.o. male pursuing MBS w/ Dr. Quinones.     From Consult 10/22/24:  \"The patient returns for final visit prior to metabolic and bariatric surgery specifically the sleeve gastrectomy.  Original intake evaluation Tricia DESHPANDE PA-C dated 2023 reviewed.  She notes he says he has been overweight his whole life maximum weight 465 pounds and lost 100 pounds 2 years ago with dietary modification eating mostly chicken but maintained it for only 1 year and the current weight was 387 pounds with a BMI of 52 and that he is disabled cannot read or write and lives with his 2 teenage daughters and is hesitant to proceed with surgery because he is her primary caregiver and needs to be around for them but also says he knows that he needs to lose weight due to his cardiac issues left ventricular hypertrophy and hypertension.     The patient has had issues with morbid obesity for years and only temporary success with non-surgical methods of weight loss.  The patient is seeking LSG to help with the morbid obesity related conditions of anxiety, asthma, peripheral edema, former smoker and dipper, heartburn, hypertension, joint pain, right lung lesion, left ventricular hypertrophy secondary to hypertension, obstructive sleep apnea noncompliant with device.    43-year-old disabled gentleman from [facility].  He takes care of 2 teenage children, his older child age 17 is pregnant and due in 6 months.  He lives in an apartment and says he can smell marijuana smoke through the walls.  He no longer smokes cigarettes or dips.  He says originally he went to medical weight management but his medical card " "would not cover the medication.  One of his main complaints is persistent peripheral edema after getting COVID a few years ago.  He denies reflux symptoms per se but has what he describes as chest pain.  Cardiac workup including heart cath was negative.  He is illiterate and I encouraged him to have his children read labels for him so he can avoid corn syrup going forward.  He attended informed consent last night and says he found it helpful and says he has been doing all the wrong things.  He is aware at his BMI sleeve gastrectomy at least theoretically may be a first stage procedure.  He does not know why he is on baby aspirin, says he thinks our office prescribed it, he voiced understanding to avoid aspirin 1 week prior and 6 weeks after sleeve gastrectomy to minimize the risk of delayed leak and says he will comply.  He says he is intolerant of his CPAP, still has a machine and does not use it.  He denies any gallbladder symptoms.  He denies personal or family history of bleeding or clotting disorders but admits he does not really know his family history.\"    Update 1/29/25:  Surgery postponed b/c patient called w/ concerns that he had \"messed up\" his preop diet and also admitted to nicotine vape exposure.      Returns today for reevaluation.  Says that he very much wishes to proceed w/ surgery, but admits he has some concerns w/ the dietary instructions.  Feels limited by the fact that he is illiterate, says he is not always sure of what he is eating.        Eats only 1 meal/day, limited food options - pork chop, hamburger, chicken, cheese.  Cooks w/ air fryer.  Does his own grocery shopping.  Has made protein powder shakes in the past when he went to the gym years ago.  Has not recently experimented w/ any premade protein shakes.      Says he plans to commit to a high protein, low carb diet for a month - as his own personal trial, to see if he can do it.     Denies ASA/NSAIDS/steroids - stopped Arnodlo after MD " "Consult eval.     Denies tobacco/nicotine use.  Denies 2nd hand tobacco exposure.  Admits daughter's boyfriend vapes in the home.  19 y/o daughter is pregnant and moved out.  Younger daughter lives w/ him but does not offer much support/assistance.     Update 3/3/25:  Returns for reevaluation.  Has not been able to focus or commit to the dietary changes discussed previously.  Says his mom has been sick and he has been busy caring for her.  Also says he is having dental issues, feels that needs to be resolved prior to proceeding w/ surgery.  Wishes to have a few more weeks to refocus.\"    Update 05/01/25: Returns for reevaluation. Has been avoiding nicotine exposure- daughter and her boyfriend moved out 2 months ago. Has been on the pre-op high protein, low carb diet. Focusing on lots of meat, vegetables. Wishes to have 1 more month, until new grandson gets out of the hospital.\"     Update 06/01/2025. Today, patient expresses reluctance towards Bariatric Surgery. Patient alludes to the financial stress post operatively regarding protein shakes and vitamins. States he is unsure if he is wanting to proceed. In addition, reports surgery would not be this month due to some upcoming dental work already scheduled.       Medically, denies new medication or recent hospitalizations. Patient is +3 pounds since LOV. States he continues to focus of meats and vegetables. Request one more month.     Past Medical History:   Diagnosis Date    Anxiety     Asthma     Edema     Lasix prn, no KCl    Former smoker     Heartburn     chronic/episodic, prn TUMS, EGD Dr. Mills 7/24    History of prior use of snuff     Hyperlipidemia     Hypertension     Joint pain     Lesion of right lung     likely benign, following w/ pulmonary, repeat CT in 1 year    LVH (left ventricular hypertrophy)     d/t HTN, follows w/ Dr. Mendez    Morbid obesity with BMI of 50.0-59.9, adult     Peripheral edema     Sleep apnea     non-compliant w/ device "     Past Surgical History:   Procedure Laterality Date    CARDIAC CATHETERIZATION N/A 4/26/2024    Procedure: Left Heart Cath;  Surgeon: Maxwell Mendez MD;  Location: [facility] CATH INVASIVE LOCATION;  Service: Cardiovascular;  Laterality: N/A;    ENDOSCOPY N/A 7/15/2024    Procedure: ESOPHAGOGASTRODUODENOSCOPY WITH BIOPSY;  Surgeon: Elsie Mills MD;  Location:  [facility];  Service: General;  Laterality: N/A;       Allergies   Allergen Reactions    Beef (Diagnostic) Unknown - Low Severity     Tested positive    Mustard Unknown - Low Severity     Allergen tested positive    Shellfish-Derived Products Unknown - Low Severity     Allergen tested positive; reports that he has had iv contrast dye and tolerated without reaction       Current Outpatient Medications:     albuterol sulfate  (90 Base) MCG/ACT inhaler, Inhale 2 puffs Every 6 (Six) Hours As Needed for Wheezing., Disp: , Rfl:     busPIRone (BUSPAR) 10 MG tablet, Take 1 tablet by mouth Daily., Disp: , Rfl:     cetirizine (zyrTEC) 10 MG tablet, Take 1 tablet by mouth Daily., Disp: , Rfl:     fenofibrate 160 MG tablet, Take 1 tablet by mouth Daily., Disp: , Rfl:     fluticasone (FLONASE) 50 MCG/ACT nasal spray, 2 sprays by Each Nare route As Needed for Allergies or Rhinitis. Shake liquid, Disp: , Rfl:     furosemide (LASIX) 40 MG tablet, Take 1 tablet by mouth Daily., Disp: , Rfl:     hydrOXYzine (ATARAX) 25 MG tablet, Take 1 tablet by mouth Daily As Needed for Itching, Allergies or Anxiety., Disp: , Rfl:     losartan (COZAAR) 100 MG tablet, Take 1 tablet by mouth Daily., Disp: , Rfl:     Social History     Socioeconomic History    Marital status: Single   Tobacco Use    Smoking status: Former     Types: Cigarettes     Passive exposure: Past    Smokeless tobacco: Former     Types: Snuff     Quit date: 2019   Vaping Use    Vaping status: Never Used   Substance and Sexual Activity    Alcohol use: Never    Drug use: Never    Sexual activity:  Never     Family History   Problem Relation Age of Onset    Obesity Mother     Hypertension Mother     Obesity Father     Hypertension Father     Sleep apnea Father     Diabetes Sister     Obesity Paternal Grandmother     Heart attack Paternal Grandfather      From Consult 10/22/24:   Review of Systems   Constitutional:  Positive for unexpected weight gain. Negative for chills, diaphoresis, fever and unexpected weight loss.   HENT:  Negative for congestion and facial swelling.    Eyes:  Negative for blurred vision, double vision and discharge.   Respiratory:  Negative for chest tightness, shortness of breath and stridor.    Cardiovascular:  Positive for chest pain and leg swelling. Negative for palpitations.   Gastrointestinal:  Negative for blood in stool.   Endocrine: Negative for polydipsia.   Genitourinary:  Negative for hematuria.   Musculoskeletal:  Positive for arthralgias.   Skin:  Negative for color change.   Allergic/Immunologic: Negative for immunocompromised state.   Neurological:  Negative for confusion.   Psychiatric/Behavioral:  Positive for sleep disturbance. Negative for self-injury.        Physical Exam:  Vital Signs:  Weight: (!) 189 kg (416 lb 8 oz)   Body mass index is 56.49 kg/m².  Temp: 97.8 °F (36.6 °C)   Heart Rate: 84   BP: 120/68     Physical Exam  Constitutional:       General: He is not in acute distress.     Appearance: He is well-developed. He is not diaphoretic.   Eyes:      General: No scleral icterus.  Cardiovascular:      Rate and Rhythm: Normal rate.   Pulmonary:      Effort: Pulmonary effort is normal.   Musculoskeletal:         General: Normal range of motion.   Neurological:      Mental Status: He is alert and oriented to person, place, and time.         Patient Active Problem List   Diagnosis    Essential hypertension    Hyperlipidemia LDL goal <100    LVH (left ventricular hypertrophy)    Chest pain    Leg pain    Ground glass opacity Right Agenda stable from 2021    WILLS  "(dyspnea on exertion)    LACY (obstructive sleep apnea) - non-compliant with CPAP    Anxiety    Joint pain    Morbid obesity with BMI of 50.0-59.9, adult    Edema    SOB (shortness of breath)    Abnormal nuclear stress test    Anginal equivalent    Heartburn    Morbid obesity with body mass index (BMI) of 50.0 to 59.9 in adult    Body mass index (BMI) of 50-59.9 in adult     From Consult 10/22/24 w/ Dr. Quinones:   Assessment: \" Ziggy Liriano is a 44 y.o. male with medically complicated obesity.    Metabolic and bariatric surgery is deemed medically necessary given the following obesity related comorbidities including anxiety, asthma, peripheral edema, former smoker and dipper, heartburn, hypertension, joint pain, right lung lesion, left ventricular hypertrophy secondary to hypertension, obstructive sleep apnea noncompliant with device with current Weight: (!) 189 kg (416 lb 8 oz) and Body mass index is 56.49 kg/m².    No diagnosis found.     Patient is aware that surgery is a tool, and that weight loss and improvement in comorbidities is not guaranteed but only seen in the context of appropriate use, follow up and physical activity.    The patient was present for an approximately a 2.5 hour discussion of the purpose of MBS, how MBS is a tool to assist in achieving weight loss goals, the most common complications and how best to avoid them, and the strategies for short and long term weight loss and improvement in comorbidities.  Ample opportunity to discuss questions was available both in group and during the time of individual examination.    I reviewed his Efrain report which is negative.  Labs dated 10/15/2024 unremarkable CMP and CBC except for an MCH of 34 of note hemoglobin 15.2.  EGD Dr. Mills dated 7/15/2024 noting GE junction at 40 cm no hiatal hernia she notes the patient has episodic heartburn associate with fast food and takes Tums as needed.  Pathology of the antrum showed reactive changes " negative for H. pylori distal esophageal biopsy showed reactive changes otherwise unremarkable.  Pulmonary clearance dated 1/31/2024 Wendie SHI AILYN noting normal pulmonary function testing with slightly decreased FVC likely secondary to body habitus she noted he was also having quite a bit of pleuritic type pain with deep breaths and not sure he gave his best effort she cleared him at low risk with a 1.6% pulmonary complication rate and a total criteria point count of 15.  Psychosocial evaluation dated 10/7/2024 Cookie SHI PhD good candidate.  Dietitian evaluation dated 11/30/2023 Linette FRAZIER RD noting that protein intake is too low to ensure successful weight loss that intake of processed and simple carbohydrate is high in the form of fast food and he has minimal balance of fruits and vegetables fast food intake is moderate he states he will only weekly or biweekly eats fast food and sweet beverages are not a problem.  Labs dated 11/30/2023 negative H. pylori breath test unremarkable CBC except for macrocytic indices of note hemoglobin 16.2 normal hemoglobin A1c 5.0 unremarkable CMP normal lipid panel normal TSH cardiology clearance dated 5/21/2024 Maxwell Mendez MD low risk.  He notes the patient had normal stress test March 2024 and left heart cath on 4/26/2024 showed normal coronaries and echo on 2/29/2024 showed an ejection fraction of 56 to 60%.  Please see scanned records that I have reviewed and signed off on today.  All of this in addition to the patient's unique history and exam has been taken into consideration in determining their appropriate candidacy for MBS.    Complications  of laparoscopic/possible robotic gastric sleeve were discussed. The patient is well aware of the potential complications of surgery that include but not limited to bleeding, infections, deep venous thrombosis, pulmonary embolism, pulmonary complications such as pneumonia, cardiac events, hernias, small bowel obstruction, damage to the  "spleen or other organs, bowel injury, disfiguring scars, failure to lose weight, need for additional surgery, conversion to an open procedure, and death. Patient is also aware of complications which apply in this particular procedure that can include but are not limited to a \"leak\" at the staple line which in some instances may require conversion to gastric bypass.    The patient is aware if a hiatal hernia is encountered, it likely will be repaired.  R/B/A Rx to hiatal hernia repair were discussed as outlined in our long consent form.  Briefly risks in addition to those for LSG include recurrent hernia, ANUPAMA, dysphagia, esophageal injury, pneumothorax, injury to the vagus nerves, injury to the thoracic duct, aorta or vena cava.    I discussed avoiding all tobacco products, nicotine,  and second hand smoke at least 2 weeks pre-operatively and 6 weeks post-operatively to minimize the risk of sleeve leak.  This included discussing the importance of avoiding even secondhand smoke as the risk of leak is increased.  Examples discussed:  Avoid going in a house or riding in a car where someone has previously smoked in the last 2 weeks and for 6 weeks postoperatively.  Avoid living in a house where someone smokes (even if it's in a separate room/patio/attached garage, etc.).   Avoid congregating with a group of people who are smoking even if it's outside.  It is OK to be around wood burning fires and barbecue.  I explained that I do not know if marijuana has a same effects but my overall recommendation is to avoid it for 2 weeks prior in 6 weeks after surgery.     Discussed the risks, benefits and alternative therapies at great length as outlined in our extensive consent forms, consent videos, and educational teaching process under the direction of the center's .    A copy of the patient's signed informed consent is on file.    R/B/A Rx discussed to postop anticoagulation incl but not limited to bleeding, " "drug reaction, venothromboembolic events, etc. and agreeable to taking post op  Eliquis 2.5 mg po Q 12 hrs #42    Plan:  After evaluation today I think the patient is a reasonable candidate for laparoscopic sleeve gastrectomy and EGD.  Once again given his BMI sleeve gastrectomy at least theoretically may be a first stage procedure as above.  Watch potassium perioperatively on chronic Lasix therapy.      Other issues include:   anxiety, asthma, peripheral edema, former smoker and dipper, heartburn, hypertension, joint pain, right lung lesion, left ventricular hypertrophy secondary to hypertension, obstructive sleep apnea noncompliant with device.\"    ---- Surgery postponed pending further evaluation.  Discussed importance of following perioperative instructions.  Reviewed necessary dietary modifications/commitments.      RTC in 1 month for reevaluation. If patient is still undecided will consider \"pausing process\" for a later date.      I spent 30 minutes caring for Ziggy on this date of service. This time includes time spent by me in the following activities: preparing for the visit, counseling and educating the patient/family/caregiver, and referring and communicating with other health care professionals.       Moy Silverman, APRN    "

## 2025-07-03 ENCOUNTER — OFFICE VISIT (OUTPATIENT)
Dept: BARIATRICS/WEIGHT MGMT | Facility: CLINIC | Age: 45
End: 2025-07-03
Payer: COMMERCIAL

## 2025-07-03 VITALS
RESPIRATION RATE: 18 BRPM | BODY MASS INDEX: 42.66 KG/M2 | SYSTOLIC BLOOD PRESSURE: 126 MMHG | WEIGHT: 315 LBS | OXYGEN SATURATION: 95 % | TEMPERATURE: 98.2 F | HEART RATE: 87 BPM | DIASTOLIC BLOOD PRESSURE: 82 MMHG | HEIGHT: 72 IN

## 2025-07-03 DIAGNOSIS — E66.01 MORBID OBESITY WITH BMI OF 50.0-59.9, ADULT: Primary | ICD-10-CM

## 2025-07-03 PROCEDURE — 1159F MED LIST DOCD IN RCRD: CPT | Performed by: NURSE PRACTITIONER

## 2025-07-03 PROCEDURE — 3074F SYST BP LT 130 MM HG: CPT | Performed by: NURSE PRACTITIONER

## 2025-07-03 PROCEDURE — 99214 OFFICE O/P EST MOD 30 MIN: CPT | Performed by: NURSE PRACTITIONER

## 2025-07-03 PROCEDURE — 1160F RVW MEDS BY RX/DR IN RCRD: CPT | Performed by: NURSE PRACTITIONER

## 2025-07-03 PROCEDURE — 3079F DIAST BP 80-89 MM HG: CPT | Performed by: NURSE PRACTITIONER

## 2025-07-03 RX ORDER — AMLODIPINE BESYLATE 10 MG/1
1 TABLET ORAL DAILY
COMMUNITY
Start: 2025-06-18

## 2025-07-03 NOTE — PROGRESS NOTES
"[facility] BARIATRIC SURGERY  [address]  [address]  [facility] [id number] [phone]      Patient  Name:  Ziggy Liriano  :  1980      Date of Visit:  2025      Chief Complaint:  weight gain; unable to maintain weight loss.        History of Present Illness:  Ziggy Liriano is a 44 y.o. male pursuing MBS w/ Dr. Quinones.     From Consult 10/22/24:  \"The patient returns for final visit prior to metabolic and bariatric surgery specifically the sleeve gastrectomy.  Original intake evaluation Tricia DESHPANDE PA-C dated 2023 reviewed.  She notes he says he has been overweight his whole life maximum weight 465 pounds and lost 100 pounds 2 years ago with dietary modification eating mostly chicken but maintained it for only 1 year and the current weight was 387 pounds with a BMI of 52 and that he is disabled cannot read or write and lives with his 2 teenage daughters and is hesitant to proceed with surgery because he is her primary caregiver and needs to be around for them but also says he knows that he needs to lose weight due to his cardiac issues left ventricular hypertrophy and hypertension.     The patient has had issues with morbid obesity for years and only temporary success with non-surgical methods of weight loss.  The patient is seeking LSG to help with the morbid obesity related conditions of anxiety, asthma, peripheral edema, former smoker and dipper, heartburn, hypertension, joint pain, right lung lesion, left ventricular hypertrophy secondary to hypertension, obstructive sleep apnea noncompliant with device.    43-year-old disabled gentleman from [facility].  He takes care of 2 teenage children, his older child age 17 is pregnant and due in 6 months.  He lives in an apartment and says he can smell marijuana smoke through the walls.  He no longer smokes cigarettes or dips.  He says originally he went to medical weight management but his medical card " "would not cover the medication.  One of his main complaints is persistent peripheral edema after getting COVID a few years ago.  He denies reflux symptoms per se but has what he describes as chest pain.  Cardiac workup including heart cath was negative.  He is illiterate and I encouraged him to have his children read labels for him so he can avoid corn syrup going forward.  He attended informed consent last night and says he found it helpful and says he has been doing all the wrong things.  He is aware at his BMI sleeve gastrectomy at least theoretically may be a first stage procedure.  He does not know why he is on baby aspirin, says he thinks our office prescribed it, he voiced understanding to avoid aspirin 1 week prior and 6 weeks after sleeve gastrectomy to minimize the risk of delayed leak and says he will comply.  He says he is intolerant of his CPAP, still has a machine and does not use it.  He denies any gallbladder symptoms.  He denies personal or family history of bleeding or clotting disorders but admits he does not really know his family history.\"    Update 1/29/25:  Surgery postponed b/c patient called w/ concerns that he had \"messed up\" his preop diet and also admitted to nicotine vape exposure.      Returns today for reevaluation.  Says that he very much wishes to proceed w/ surgery, but admits he has some concerns w/ the dietary instructions.  Feels limited by the fact that he is illiterate, says he is not always sure of what he is eating.        Eats only 1 meal/day, limited food options - pork chop, hamburger, chicken, cheese.  Cooks w/ air fryer.  Does his own grocery shopping.  Has made protein powder shakes in the past when he went to the gym years ago.  Has not recently experimented w/ any premade protein shakes.      Says he plans to commit to a high protein, low carb diet for a month - as his own personal trial, to see if he can do it.     Denies ASA/NSAIDS/steroids - stopped Arnoldo after MD " Consult eval.     Denies tobacco/nicotine use.  Denies 2nd hand tobacco exposure.  Admits daughter's boyfriend vapes in the home.  17 y/o daughter is pregnant and moved out.  Younger daughter lives w/ him but does not offer much support/assistance.     Update 07/03/2025:    No changes in medical history. Denies tobacco exposure in the house. Not currently focusing on anything diet wise. Wanting to refocus on the diet for 1 month. Needs handout again.       Past Medical History:   Diagnosis Date    Anxiety     Asthma     Edema     Lasix prn, no KCl    Former smoker     Heartburn     chronic/episodic, prn TUMS, EGD Dr. Mills 7/24    History of prior use of snuff     Hyperlipidemia     Hypertension     Joint pain     Lesion of right lung     likely benign, following w/ pulmonary, repeat CT in 1 year    LVH (left ventricular hypertrophy)     d/t HTN, follows w/ Dr. Mendez    Morbid obesity with BMI of 50.0-59.9, adult     Peripheral edema     Sleep apnea     non-compliant w/ device     Past Surgical History:   Procedure Laterality Date    CARDIAC CATHETERIZATION N/A 4/26/2024    Procedure: Left Heart Cath;  Surgeon: Maxwell Mendez MD;  Location:  [facility] CATH INVASIVE LOCATION;  Service: Cardiovascular;  Laterality: N/A;    ENDOSCOPY N/A 7/15/2024    Procedure: ESOPHAGOGASTRODUODENOSCOPY WITH BIOPSY;  Surgeon: Elsie Mills MD;  Location:  [facility] ENDOSCOPY;  Service: General;  Laterality: N/A;       Allergies   Allergen Reactions    Beef (Diagnostic) Unknown - Low Severity     Tested positive    Mustard Unknown - Low Severity     Allergen tested positive    Shellfish-Derived Products Unknown - Low Severity     Allergen tested positive; reports that he has had iv contrast dye and tolerated without reaction       Current Outpatient Medications:     albuterol sulfate  (90 Base) MCG/ACT inhaler, Inhale 2 puffs Every 6 (Six) Hours As Needed for Wheezing., Disp: , Rfl:     amLODIPine (NORVASC) 10 MG tablet,  Take 1 tablet by mouth Daily., Disp: , Rfl:     busPIRone (BUSPAR) 10 MG tablet, Take 1 tablet by mouth Daily., Disp: , Rfl:     cetirizine (zyrTEC) 10 MG tablet, Take 1 tablet by mouth Daily., Disp: , Rfl:     fenofibrate 160 MG tablet, Take 1 tablet by mouth Daily., Disp: , Rfl:     fluticasone (FLONASE) 50 MCG/ACT nasal spray, 2 sprays by Each Nare route As Needed for Allergies or Rhinitis. Shake liquid, Disp: , Rfl:     furosemide (LASIX) 40 MG tablet, Take 1 tablet by mouth Daily., Disp: , Rfl:     hydrOXYzine (ATARAX) 25 MG tablet, Take 1 tablet by mouth Daily As Needed for Itching, Allergies or Anxiety., Disp: , Rfl:     losartan (COZAAR) 100 MG tablet, Take 1 tablet by mouth Daily., Disp: , Rfl:     Social History     Socioeconomic History    Marital status: Single   Tobacco Use    Smoking status: Former     Types: Cigarettes     Passive exposure: Past    Smokeless tobacco: Former     Types: Snuff     Quit date: 2019   Vaping Use    Vaping status: Never Used   Substance and Sexual Activity    Alcohol use: Never    Drug use: Never    Sexual activity: Never     Family History   Problem Relation Age of Onset    Obesity Mother     Hypertension Mother     Obesity Father     Hypertension Father     Sleep apnea Father     Diabetes Sister     Obesity Paternal Grandmother     Heart attack Paternal Grandfather      From Consult 10/22/24:   Review of Systems   Constitutional:  Positive for unexpected weight gain. Negative for chills, diaphoresis, fever and unexpected weight loss.   HENT:  Negative for congestion and facial swelling.    Eyes:  Negative for blurred vision, double vision and discharge.   Respiratory:  Negative for chest tightness, shortness of breath and stridor.    Cardiovascular:  Positive for chest pain and leg swelling. Negative for palpitations.   Gastrointestinal:  Negative for blood in stool.   Endocrine: Negative for polydipsia.   Genitourinary:  Negative for hematuria.   Musculoskeletal:   "Positive for arthralgias.   Skin:  Negative for color change.   Allergic/Immunologic: Negative for immunocompromised state.   Neurological:  Negative for confusion.   Psychiatric/Behavioral:  Positive for sleep disturbance. Negative for self-injury.        Physical Exam:  Vital Signs:  Weight: (!) 191 kg (422 lb)   Body mass index is 57.23 kg/m².  Temp: 98.2 °F (36.8 °C)   Heart Rate: 87   BP: 126/82     Physical Exam  Constitutional:       General: He is not in acute distress.     Appearance: He is well-developed. He is not diaphoretic.   Eyes:      General: No scleral icterus.  Cardiovascular:      Rate and Rhythm: Normal rate.   Pulmonary:      Effort: Pulmonary effort is normal.   Musculoskeletal:         General: Normal range of motion.   Neurological:      Mental Status: He is alert and oriented to person, place, and time.       Patient Active Problem List   Diagnosis    Essential hypertension    Hyperlipidemia LDL goal <100    LVH (left ventricular hypertrophy)    Chest pain    Leg pain    Ground glass opacity Right [place] stable from 2021    WILLS (dyspnea on exertion)    LACY (obstructive sleep apnea) - non-compliant with CPAP    Anxiety    Joint pain    Morbid obesity with BMI of 50.0-59.9, adult    Edema    SOB (shortness of breath)    Abnormal nuclear stress test    Anginal equivalent    Heartburn    Morbid obesity with body mass index (BMI) of 50.0 to 59.9 in adult    Body mass index (BMI) of 50-59.9 in adult     From Consult 10/22/24 w/ Dr. Quinones:   Assessment: \" Ziggy Liriano is a 44 y.o. male with medically complicated obesity.    Metabolic and bariatric surgery is deemed medically necessary given the following obesity related comorbidities including anxiety, asthma, peripheral edema, former smoker and dipper, heartburn, hypertension, joint pain, right lung lesion, left ventricular hypertrophy secondary to hypertension, obstructive sleep apnea noncompliant with device with current Weight: (!) 191 " kg (422 lb) and Body mass index is 57.23 kg/m².    No diagnosis found.     Patient is aware that surgery is a tool, and that weight loss and improvement in comorbidities is not guaranteed but only seen in the context of appropriate use, follow up and physical activity.    The patient was present for an approximately a 2.5 hour discussion of the purpose of MBS, how MBS is a tool to assist in achieving weight loss goals, the most common complications and how best to avoid them, and the strategies for short and long term weight loss and improvement in comorbidities.  Ample opportunity to discuss questions was available both in group and during the time of individual examination.    I reviewed his Efrain report which is negative.  Labs dated 10/15/2024 unremarkable CMP and CBC except for an MCH of 34 of note hemoglobin 15.2.  EGD Dr. Mills dated 7/15/2024 noting GE junction at 40 cm no hiatal hernia she notes the patient has episodic heartburn associate with fast food and takes Tums as needed.  Pathology of the antrum showed reactive changes negative for H. pylori distal esophageal biopsy showed reactive changes otherwise unremarkable.  Pulmonary clearance dated 1/31/2024 Wendie ROBERTSON noting normal pulmonary function testing with slightly decreased FVC likely secondary to body habitus she noted he was also having quite a bit of pleuritic type pain with deep breaths and not sure he gave his best effort she cleared him at low risk with a 1.6% pulmonary complication rate and a total criteria point count of 15.  Psychosocial evaluation dated 10/7/2024 Cookie SHI PhD good candidate.  Dietitian evaluation dated 11/30/2023 Linette FRAZIER RD noting that protein intake is too low to ensure successful weight loss that intake of processed and simple carbohydrate is high in the form of fast food and he has minimal balance of fruits and vegetables fast food intake is moderate he states he will only weekly or biweekly eats fast food and  "sweet beverages are not a problem.  Labs dated 11/30/2023 negative H. pylori breath test unremarkable CBC except for macrocytic indices of note hemoglobin 16.2 normal hemoglobin A1c 5.0 unremarkable CMP normal lipid panel normal TSH cardiology clearance dated 5/21/2024 Maxwell Mendez MD low risk.  He notes the patient had normal stress test March 2024 and left heart cath on 4/26/2024 showed normal coronaries and echo on 2/29/2024 showed an ejection fraction of 56 to 60%.  Please see scanned records that I have reviewed and signed off on today.  All of this in addition to the patient's unique history and exam has been taken into consideration in determining their appropriate candidacy for MBS.    Complications  of laparoscopic/possible robotic gastric sleeve were discussed. The patient is well aware of the potential complications of surgery that include but not limited to bleeding, infections, deep venous thrombosis, pulmonary embolism, pulmonary complications such as pneumonia, cardiac events, hernias, small bowel obstruction, damage to the spleen or other organs, bowel injury, disfiguring scars, failure to lose weight, need for additional surgery, conversion to an open procedure, and death. Patient is also aware of complications which apply in this particular procedure that can include but are not limited to a \"leak\" at the staple line which in some instances may require conversion to gastric bypass.    The patient is aware if a hiatal hernia is encountered, it likely will be repaired.  R/B/A Rx to hiatal hernia repair were discussed as outlined in our long consent form.  Briefly risks in addition to those for LSG include recurrent hernia, ANUPAMA, dysphagia, esophageal injury, pneumothorax, injury to the vagus nerves, injury to the thoracic duct, aorta or vena cava.    I discussed avoiding all tobacco products, nicotine,  and second hand smoke at least 2 weeks pre-operatively and 6 weeks post-operatively to minimize " "the risk of sleeve leak.  This included discussing the importance of avoiding even secondhand smoke as the risk of leak is increased.  Examples discussed:  Avoid going in a house or riding in a car where someone has previously smoked in the last 2 weeks and for 6 weeks postoperatively.  Avoid living in a house where someone smokes (even if it's in a separate room/patio/attached garage, etc.).   Avoid congregating with a group of people who are smoking even if it's outside.  It is OK to be around wood burning fires and barbecue.  I explained that I do not know if marijuana has a same effects but my overall recommendation is to avoid it for 2 weeks prior in 6 weeks after surgery.     Discussed the risks, benefits and alternative therapies at great length as outlined in our extensive consent forms, consent videos, and educational teaching process under the direction of the center's .    A copy of the patient's signed informed consent is on file.    R/B/A Rx discussed to postop anticoagulation incl but not limited to bleeding, drug reaction, venothromboembolic events, etc. and agreeable to taking post op  Eliquis 2.5 mg po Q 12 hrs #42    Plan:  After evaluation today I think the patient is a reasonable candidate for laparoscopic sleeve gastrectomy and EGD.  Once again given his BMI sleeve gastrectomy at least theoretically may be a first stage procedure as above.  Watch potassium perioperatively on chronic Lasix therapy.      Other issues include:   anxiety, asthma, peripheral edema, former smoker and dipper, heartburn, hypertension, joint pain, right lung lesion, left ventricular hypertrophy secondary to hypertension, obstructive sleep apnea noncompliant with device.\"    ---- Surgery postponed pending further evaluation.  Discussed importance of following perioperative instructions.  Reviewed necessary dietary modifications/commitments.  Handouts given.    RTC in 1 month for reevaluation.     I " spent 30 minutes caring for Ziggy on this date of service. This time includes time spent by me in the following activities: preparing for the visit, reviewing tests, obtaining and/or reviewing a separately obtained history, performing a medically appropriate examination and/or evaluation, counseling and educating the patient/family/caregiver, and documenting information in the medical record.       Radha Alvarado, APRN

## 2025-08-04 ENCOUNTER — OFFICE VISIT (OUTPATIENT)
Dept: BARIATRICS/WEIGHT MGMT | Facility: CLINIC | Age: 45
End: 2025-08-04
Payer: COMMERCIAL

## 2025-08-04 VITALS
WEIGHT: 315 LBS | SYSTOLIC BLOOD PRESSURE: 124 MMHG | TEMPERATURE: 98.6 F | HEART RATE: 88 BPM | BODY MASS INDEX: 42.66 KG/M2 | OXYGEN SATURATION: 98 % | RESPIRATION RATE: 18 BRPM | HEIGHT: 72 IN | DIASTOLIC BLOOD PRESSURE: 80 MMHG

## 2025-08-04 DIAGNOSIS — E66.01 MORBID OBESITY WITH BMI OF 50.0-59.9, ADULT: Primary | ICD-10-CM

## 2025-08-04 DIAGNOSIS — Z71.3 ENCOUNTER FOR WEIGHT LOSS COUNSELING: ICD-10-CM

## (undated) DEVICE — CATH DIAG EXPO M/ PK 5F FL4/FR4 PIG

## (undated) DEVICE — CVR PROB ULTRASND/TRANSD W/GEL 7X11IN STRL

## (undated) DEVICE — GLIDESHEATH SLENDER STAINLESS STEEL KIT: Brand: GLIDESHEATH SLENDER

## (undated) DEVICE — MODEL AT P65, P/N 701554-001KIT CONTENTS: HAND CONTROLLER, 3-WAY HIGH-PRESSURE STOPCOCK WITH ROTATING END AND PREMIUM HIGH-PRESSURE TUBING: Brand: ANGIOTOUCH® KIT

## (undated) DEVICE — Device: Brand: DEFENDO AIR/WATER/SUCTION AND BIOPSY VALVE

## (undated) DEVICE — TR BAND RADIAL ARTERY COMPRESSION DEVICE: Brand: TR BAND

## (undated) DEVICE — GW PERIPH GUIDERIGHT STD/EXCHNG/J/TIP SS 0.035IN 5X260CM

## (undated) DEVICE — MODEL BT2000 P/N 700287-012KIT CONTENTS: MANIFOLD WITH SALINE AND CONTRAST PORTS, SALINE TUBING WITH SPIKE AND HAND SYRINGE, TRANSDUCER: Brand: BT2000 AUTOMATED MANIFOLD KIT

## (undated) DEVICE — CONTN GRAD MEAS TRIANG 32OZ BLK

## (undated) DEVICE — ADULT, W/LG. BACK PAD, RADIOTRANSPARENT ELEMENT AND LEAD WIRE COMPATIBLE W/: Brand: DEFIBRILLATION ELECTRODES

## (undated) DEVICE — CATH DIAG EXPO .045 FL3  5F 100CM

## (undated) DEVICE — PK CATH CARD 10

## (undated) DEVICE — GLV SURG SENSICARE PI MIC PF SZ6 LF STRL

## (undated) DEVICE — ST EXT IV SMRTSTE 2VLV FIX M LL 6ML 41